# Patient Record
Sex: FEMALE | Race: BLACK OR AFRICAN AMERICAN | NOT HISPANIC OR LATINO | Employment: FULL TIME | URBAN - METROPOLITAN AREA
[De-identification: names, ages, dates, MRNs, and addresses within clinical notes are randomized per-mention and may not be internally consistent; named-entity substitution may affect disease eponyms.]

---

## 2017-03-29 PROBLEM — R73.03 PREDIABETES: Status: ACTIVE | Noted: 2017-03-29

## 2017-07-24 ENCOUNTER — HOSPITAL ENCOUNTER (EMERGENCY)
Facility: HOSPITAL | Age: 32
Discharge: HOME | End: 2017-07-24
Attending: EMERGENCY MEDICINE
Payer: COMMERCIAL

## 2017-07-24 VITALS
SYSTOLIC BLOOD PRESSURE: 130 MMHG | WEIGHT: 180 LBS | HEIGHT: 66 IN | BODY MASS INDEX: 28.93 KG/M2 | HEART RATE: 74 BPM | TEMPERATURE: 99 F | RESPIRATION RATE: 20 BRPM | DIASTOLIC BLOOD PRESSURE: 83 MMHG | OXYGEN SATURATION: 99 %

## 2017-07-24 DIAGNOSIS — T23.221A: ICD-10-CM

## 2017-07-24 DIAGNOSIS — T22.211A: Primary | ICD-10-CM

## 2017-07-24 LAB
B-HCG UR QL: NEGATIVE
CTP QC/QA: YES

## 2017-07-24 PROCEDURE — 25000003 PHARM REV CODE 250: Performed by: PHYSICIAN ASSISTANT

## 2017-07-24 PROCEDURE — 16020 DRESS/DEBRID P-THICK BURN S: CPT

## 2017-07-24 PROCEDURE — 81025 URINE PREGNANCY TEST: CPT | Performed by: EMERGENCY MEDICINE

## 2017-07-24 PROCEDURE — 99283 EMERGENCY DEPT VISIT LOW MDM: CPT | Mod: 25

## 2017-07-24 RX ORDER — ACETAMINOPHEN 325 MG/1
650 TABLET ORAL
Status: COMPLETED | OUTPATIENT
Start: 2017-07-24 | End: 2017-07-24

## 2017-07-24 RX ORDER — MELOXICAM 7.5 MG/1
7.5 TABLET ORAL DAILY
Qty: 12 TABLET | Refills: 0 | Status: SHIPPED | OUTPATIENT
Start: 2017-07-24 | End: 2018-04-09 | Stop reason: ALTCHOICE

## 2017-07-24 RX ORDER — IBUPROFEN 200 MG
800 TABLET ORAL EVERY 6 HOURS PRN
COMMUNITY
End: 2018-04-09 | Stop reason: ALTCHOICE

## 2017-07-24 RX ORDER — DOXYCYCLINE 100 MG/1
100 CAPSULE ORAL 2 TIMES DAILY
Qty: 20 CAPSULE | Refills: 0 | Status: SHIPPED | OUTPATIENT
Start: 2017-07-24 | End: 2017-08-03

## 2017-07-24 RX ORDER — DOXYCYCLINE HYCLATE 100 MG
100 TABLET ORAL
Status: COMPLETED | OUTPATIENT
Start: 2017-07-24 | End: 2017-07-24

## 2017-07-24 RX ADMIN — BACITRACIN ZINC, NEOMYCIN SULFATE, POLYMYXIN B SULFATE 1 EACH: 3.5; 5000; 4 OINTMENT TOPICAL at 06:07

## 2017-07-24 RX ADMIN — DOXYCYCLINE HYCLATE 100 MG: 100 TABLET, COATED ORAL at 06:07

## 2017-07-24 RX ADMIN — ACETAMINOPHEN 650 MG: 325 TABLET, FILM COATED ORAL at 06:07
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
## 2017-08-07 ENCOUNTER — OFFICE VISIT (OUTPATIENT)
Dept: URGENT CARE | Facility: CLINIC | Age: 32
End: 2017-08-07
Payer: COMMERCIAL

## 2017-08-07 VITALS
HEART RATE: 85 BPM | WEIGHT: 241.81 LBS | BODY MASS INDEX: 38.86 KG/M2 | TEMPERATURE: 98 F | DIASTOLIC BLOOD PRESSURE: 88 MMHG | SYSTOLIC BLOOD PRESSURE: 135 MMHG | HEIGHT: 66 IN | RESPIRATION RATE: 18 BRPM

## 2017-08-07 DIAGNOSIS — T22.211A PARTIAL THICKNESS BURN OF RIGHT FOREARM, INITIAL ENCOUNTER: Primary | ICD-10-CM

## 2017-08-07 PROCEDURE — 99203 OFFICE O/P NEW LOW 30 MIN: CPT | Mod: 25,S$GLB,, | Performed by: NURSE PRACTITIONER

## 2017-08-07 PROCEDURE — 97597 DBRDMT OPN WND 1ST 20 CM/<: CPT | Mod: S$GLB,,, | Performed by: NURSE PRACTITIONER

## 2017-08-07 PROCEDURE — 3008F BODY MASS INDEX DOCD: CPT | Mod: S$GLB,,, | Performed by: NURSE PRACTITIONER

## 2017-08-07 RX ORDER — AMITRIPTYLINE HYDROCHLORIDE 25 MG/1
TABLET, FILM COATED ORAL
Refills: 1 | COMMUNITY
Start: 2017-05-25 | End: 2018-04-09 | Stop reason: ALTCHOICE

## 2017-08-07 RX ORDER — MUPIROCIN 20 MG/G
OINTMENT TOPICAL
Qty: 22 G | Refills: 1 | Status: SHIPPED | OUTPATIENT
Start: 2017-08-07 | End: 2018-04-09 | Stop reason: ALTCHOICE

## 2017-08-07 RX ORDER — DOXYCYCLINE 100 MG/1
100 CAPSULE ORAL 2 TIMES DAILY
Qty: 20 CAPSULE | Refills: 0 | Status: SHIPPED | OUTPATIENT
Start: 2017-08-07 | End: 2017-08-17

## 2017-08-07 RX ORDER — IBUPROFEN 800 MG/1
TABLET ORAL
Refills: 0 | COMMUNITY
Start: 2017-07-17 | End: 2018-04-09 | Stop reason: ALTCHOICE
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
## 2017-08-07 NOTE — PROGRESS NOTES
Subjective:       Patient ID: Ghanshyam Harrison is a 32 y.o. female.    Chief Complaint: Burn    Burn       ROS    Objective:      Physical Exam    Assessment:       No diagnosis found.    Plan:       There are no diagnoses linked to this encounter.

## 2017-08-07 NOTE — PATIENT INSTRUCTIONS
Second-Degree Burn  A burn occurs when skin is exposed to too much heat, sun, or harsh chemicals. A second-degree burn (partial-thickness burn) is deeper than a first-degree burn (superficial burn). It usually causes a blister to form. The blister may remain intact and gradually go away on its own. Or it may break open. The goal of treatment is to relieve pain and stop infection while the burn heals.   Home care  Use pain medicine as directed. If no pain medicine was prescribed, you may use acetaminophen or ibuprofen to control pain. If you have chronic liver or kidney disease, talk with your health care provider before using these medicine. Also talk with your provider if you've had a stomach ulcer or GI bleeding.  General care  · On the first day, you may put a cool compress on the wound to ease pain. A cool compress is a small towel soaked in cool water.  · If you were sent home with the blister intact, don't break the blister. The risk for infection is greater if the blister breaks. If a bandage was applied, change it once a day, unless told otherwise. If the bandage becomes wet or soiled, change it as soon as you can.  · Sometimes an infection may occur even with proper treatment. Check the burn daily for the signs of infection listed below.  · Eat more calories and protein until your wound is healed.  · Wear a hat, sunscreen, and long sleeves while in the sun to protect the skin.  · Don't pick or scratch at the wound. Use over-the-counter medicines like diphenhydramine for itching.  · Avoid tight-fitting clothes.  To change a bandage:  · Wash your hands.  · Take off the old bandage. If the bandage sticks, soak it off under warm running water.  · Once the bandage is off, gently wash the burn area with mild soap and warm water to remove any cream, ointment, ooze, or scab. You may do this in a sink, under a tub faucet, or in the shower. Rinse off the soap and gently pat dry with a clean towel.  · Check for  signs of infection listed below.  · Put any prescribed antibiotic cream or ointment on the wound.  · Cover the burn with nonstick gauze. Then wrap it with the bandage material.  Follow-up care  Follow up with your health care provider, or as advised.  When to seek medical advice  Call your health care provider right away if you have any of these signs of infection:  · Fever over 100.4°F (38°C)  · Pain that gets worse  · Redness or swelling that gets worse  · Pus comes from the burn  · Red streaks in your skin coming from the burn  · Wound doesn't appear to be healing  · Nausea or vomiting   Date Last Reviewed: 10/24/2014  © 0296-4368 [employer]. [address] [place] [address], [place] [address]. All rights reserved. This information is not intended as a substitute for professional medical care. Always follow your healthcare professional's instructions.      DISCONTINUE USING ALOE PLANT ON BURN WOUND  WOUND CARE 2-3 TIMES A DAY AS DISCUSSED    Please return here or go to the Emergency Department for any concerns or worsening of condition.  If you were prescribed antibiotics, please take them to completion.  If you were prescribed a narcotic medication, do not drive or operate heavy equipment or machinery while taking these medications.  Please follow up with your primary care doctor or specialist as needed.    If you  smoke, please stop smoking.

## 2018-04-09 ENCOUNTER — LAB VISIT (OUTPATIENT)
Dept: LAB | Facility: HOSPITAL | Age: 33
End: 2018-04-09
Attending: OBSTETRICS & GYNECOLOGY
Payer: COMMERCIAL

## 2018-04-09 ENCOUNTER — OFFICE VISIT (OUTPATIENT)
Dept: OBSTETRICS AND GYNECOLOGY | Facility: CLINIC | Age: 33
End: 2018-04-09
Payer: COMMERCIAL

## 2018-04-09 VITALS
BODY MASS INDEX: 36.14 KG/M2 | DIASTOLIC BLOOD PRESSURE: 74 MMHG | SYSTOLIC BLOOD PRESSURE: 118 MMHG | HEIGHT: 66 IN | WEIGHT: 224.88 LBS

## 2018-04-09 DIAGNOSIS — N91.2 AMENORRHEA: Primary | ICD-10-CM

## 2018-04-09 DIAGNOSIS — Z86.39: ICD-10-CM

## 2018-04-09 DIAGNOSIS — N92.6 MISSED PERIOD: ICD-10-CM

## 2018-04-09 LAB
B-HCG UR QL: POSITIVE
CTP QC/QA: YES
HCG INTACT+B SERPL-ACNC: 59 MIU/ML
PROLACTIN SERPL IA-MCNC: 9 NG/ML

## 2018-04-09 PROCEDURE — 81025 URINE PREGNANCY TEST: CPT | Mod: S$GLB,,, | Performed by: OBSTETRICS & GYNECOLOGY

## 2018-04-09 PROCEDURE — 36415 COLL VENOUS BLD VENIPUNCTURE: CPT

## 2018-04-09 PROCEDURE — 99999 PR PBB SHADOW E&M-EST. PATIENT-LVL III: CPT | Mod: PBBFAC,,, | Performed by: OBSTETRICS & GYNECOLOGY

## 2018-04-09 PROCEDURE — 99204 OFFICE O/P NEW MOD 45 MIN: CPT | Mod: S$GLB,,, | Performed by: OBSTETRICS & GYNECOLOGY

## 2018-04-09 PROCEDURE — 84702 CHORIONIC GONADOTROPIN TEST: CPT

## 2018-04-09 PROCEDURE — 84146 ASSAY OF PROLACTIN: CPT

## 2018-04-09 RX ORDER — BROMOCRIPTINE MESYLATE 2.5 MG/1
TABLET ORAL
Refills: 0 | COMMUNITY
Start: 2018-03-09 | End: 2018-05-07

## 2018-04-11 ENCOUNTER — LAB VISIT (OUTPATIENT)
Dept: LAB | Facility: HOSPITAL | Age: 33
End: 2018-04-11
Attending: OBSTETRICS & GYNECOLOGY
Payer: COMMERCIAL

## 2018-04-11 ENCOUNTER — PATIENT MESSAGE (OUTPATIENT)
Dept: OBSTETRICS AND GYNECOLOGY | Facility: CLINIC | Age: 33
End: 2018-04-11

## 2018-04-11 DIAGNOSIS — N92.6 MISSED PERIOD: ICD-10-CM

## 2018-04-11 LAB — HCG INTACT+B SERPL-ACNC: 121 MIU/ML

## 2018-04-11 PROCEDURE — 36415 COLL VENOUS BLD VENIPUNCTURE: CPT

## 2018-04-11 PROCEDURE — 84702 CHORIONIC GONADOTROPIN TEST: CPT

## 2018-04-12 ENCOUNTER — TELEPHONE (OUTPATIENT)
Dept: OBSTETRICS AND GYNECOLOGY | Facility: CLINIC | Age: 33
End: 2018-04-12

## 2018-04-12 ENCOUNTER — PATIENT MESSAGE (OUTPATIENT)
Dept: OBSTETRICS AND GYNECOLOGY | Facility: CLINIC | Age: 33
End: 2018-04-12

## 2018-04-12 DIAGNOSIS — Z34.90 EARLY STAGE OF PREGNANCY: Primary | ICD-10-CM

## 2018-04-12 NOTE — TELEPHONE ENCOUNTER
----- Message from Julia Corrigan sent at 4/11/2018  3:59 PM CDT -----  Contact: self  Patient is calling for test result and can be reached at [phone].    Thanks,

## 2018-04-12 NOTE — TELEPHONE ENCOUNTER
----- Message from Rhina Aparicio sent at 4/12/2018 10:54 AM CDT -----  Contact: Self/ [phone]  Pt was returning phone call for results again. Thank you!

## 2018-04-12 NOTE — TELEPHONE ENCOUNTER
Spoke with pt and informed her that I will speak to  about releasing her HCG result through the portal. Pt is also requesting progesterone labs to be placed

## 2018-04-13 ENCOUNTER — LAB VISIT (OUTPATIENT)
Dept: LAB | Facility: HOSPITAL | Age: 33
End: 2018-04-13
Attending: OBSTETRICS & GYNECOLOGY
Payer: COMMERCIAL

## 2018-04-13 DIAGNOSIS — N92.6 MISSED PERIOD: ICD-10-CM

## 2018-04-13 DIAGNOSIS — Z34.90 EARLY STAGE OF PREGNANCY: ICD-10-CM

## 2018-04-13 LAB
HCG INTACT+B SERPL-ACNC: 323 MIU/ML
PROGEST SERPL-MCNC: 28.3 NG/ML

## 2018-04-13 PROCEDURE — 36415 COLL VENOUS BLD VENIPUNCTURE: CPT

## 2018-04-13 PROCEDURE — 84702 CHORIONIC GONADOTROPIN TEST: CPT

## 2018-04-13 PROCEDURE — 84144 ASSAY OF PROGESTERONE: CPT

## 2018-04-15 PROBLEM — Z86.39: Status: ACTIVE | Noted: 2018-04-15

## 2018-04-15 NOTE — PROGRESS NOTES
[facility]  Ambulatory Clinic  Obstetrics & Gynecology    Visit Date:  2018     Chief Complaint:  Missed my period    History of Present Illness:      Ghanshyam Harrison is a 33 y.o. , UPT positive today, here with c/o missed period.    Patient's last menstrual period was 2018 (approximate).    Pt has no major complaints today and denies any vaginal bleeding, discharge, pain, GI/ compliants.      Pt has h/o elective  x 1 with suction D&C and spontaneous  x 1 ~6 wks gestation.    Pt has h/o prolactinemia dx earlier this year, currently on bromocriptine 2.5 mg qday.  Pt reports a normal CT of head.    Pt reports overall good health.    Past Medical History:      Obesity  Prolactinemia, dx 2018, currently on bromocriptine 2.5 mg qd    Past Surgical History:      Open cholecystectomy   Suction D&C     Medications:      Prenatal vitamins   Bromocriptine 2.5 mg qd - pt advised to stop as prolactin level is normal    Allergies:      Review of patient's allergies indicates:   Allergen Reactions    Ace inhibitors Anaphylaxis    Amoxicillin Anaphylaxis    Bactrim [sulfamethoxazole-trimethoprim] Anaphylaxis    Lisinopril Anaphylaxis    Penicillins Anaphylaxis     Obstetric History:       Para Term  AB Living   3 0     2 0   SAB TAB Ectopic Multiple Live Births   1              # Outcome Date GA Lbr Raul/2nd Weight Sex Delivery Anes PTL Lv   3 Current            2 SAB            1 AB              Gynecologic History:      Denies recent/active STI  Denies history abnormal Pap     Social History:      Denies tobacco, alcohol or illicit drug use  Current partner is father of baby  Denies domestic abuse     Family History:      Denies congenital anomalies, inherited syndromes, fetal aneuploidy    Review of Systems:      Constitutional:  No fever, fatigue  HENT:  No congestion, hearing changes  Eyes:  No visual disturbance  Respiratory:  No cough,  "shortness of breath  Cardiovascular:  No chest pain, leg swelling  Breast:  No lump, pain, nipple discharge, redness, skin changes  Gastrointestinal:  No abdominal pain, constipation, blood in stool   Genitourinary:  No dysuria, frequency  Endocrine:  No heat or cold intolerance  Musculoskeletal:  No back pain, arthralgias  Skin:  No rash, jaundice  Neurological:  No dizziness, weakness, headaches  Psychiatric/Behavioral:  No sleep disturbance, dysphoric mood     Physical Exam:     /74 (BP Location: Right arm, Patient Position: Sitting, BP Method: Large (Manual))   Ht 5' 6" (1.676 m)   Wt 102 kg (224 lb 13.9 oz)   LMP 2018 (Approximate)   BMI 36.29 kg/m²      GENERAL:  NAD. Well-nourished. A&Ox3.  HEENT:  NCAT, EOMI, moist mucus membranes.  Neck supple w/o masses.  BREAST:  Symmetric, no obvious masses, adenopathy, skin changes or nipple discharge.  LUNGS:  CTA-B.  HEART:  RRR, physiologic heart sounds.  ABDOMEN:  Soft, non-tender. Normoactive BS.  No obvious organomegaly.   EXT:  Symmetric w/o cramping, claudication, or edema. +2 distal pulses. FROM.  SKIN:  No rashes  NEURO:  CN II - XII grossly intact bilaterally. +2 DTR. Visual field test normal.  PSYCH:  Mood & affect appropriate.       PELVIC:  Female external genitalia w/o any obvious lesions.  Adequate perineal body. Normal urethral meatus. No gross lymphadenopathy.    Vagina:  Pink, moist, well-rugated.  Vaginal vault with good support.  No obvious lesion.  No discharge noted.     Cervix:  No cervical motion tenderness, discharge, or obvious lesions.  Closed, thick, posterior.  Uterus:  Small, non-tender, normal contour.  Adnexa:  No masses or tenderness.    Rectal:  Declined.  No obvious external lesions.   Wet prep:  Negative    Chaperone present for exam.    Assessment:     1. 33 y.o. , UPT positive, LMP 2018, here for confirmation of pregnancy  2. Prolactinemia, dx 2018, currently on bromocriptine 2.5 mg qd  3. Maternal " obesity - Body mass index is 36.29 kg/m².    Plan:    We discussed principles of prenatal care, weight gain goals, dietary/lifestyle modifications, pregnancy care instructions and precautions.  Prenatal educational material given to pt.  Continue prenatal vitamins.  SAB and ectopic precautions reviewed.      Discussed the implication of prolactinemia on her pregnancy.  Order prolactin level today, if normal pt was advised to stop bromocriptine.  Serial prolactin level during pregnancy.  Resume treatment bromocriptine therapy if prolactin elevated or clinical signs of prolactinoma.  MRI of brain as clinically indicated.      Discussed implications of obesity on pregnancy including but are not limited to miscarriage, poor ultrasound visualization, increase in congenital malformations (especially NTD's and cardiac anomalies),  birth, ascending infections, gestational diabetes, hypertensive diseases of pregnancy, macrosomia, shoulder dystocia, cerebral palsy and surgical complications such as wound infections, dehiscence and thrombosis.  The [employer] has recommended no more than a 15# weight gain in patients with class III (BMI >/= 40) obesity or greater.  Encourage healthy lifestyle modifications.    Return in 4 weeks to initiate prenatal care.  All questions answered, pt voiced understanding.      Jonel Pollard MD

## 2018-05-07 ENCOUNTER — INITIAL PRENATAL (OUTPATIENT)
Dept: OBSTETRICS AND GYNECOLOGY | Facility: CLINIC | Age: 33
End: 2018-05-07
Payer: COMMERCIAL

## 2018-05-07 ENCOUNTER — LAB VISIT (OUTPATIENT)
Dept: LAB | Facility: HOSPITAL | Age: 33
End: 2018-05-07
Attending: OBSTETRICS & GYNECOLOGY
Payer: COMMERCIAL

## 2018-05-07 VITALS
DIASTOLIC BLOOD PRESSURE: 80 MMHG | SYSTOLIC BLOOD PRESSURE: 115 MMHG | HEART RATE: 64 BPM | WEIGHT: 227.06 LBS | BODY MASS INDEX: 36.65 KG/M2

## 2018-05-07 DIAGNOSIS — Z34.91 PREGNANCY WITH ONE FETUS IN FIRST TRIMESTER: Primary | ICD-10-CM

## 2018-05-07 DIAGNOSIS — Z34.91 PREGNANCY WITH ONE FETUS IN FIRST TRIMESTER: ICD-10-CM

## 2018-05-07 DIAGNOSIS — Z36.89 ENCOUNTER TO ESTABLISH GESTATIONAL AGE USING ULTRASOUND: ICD-10-CM

## 2018-05-07 LAB
ABO + RH BLD: NORMAL
ALBUMIN SERPL BCP-MCNC: 3.6 G/DL
ALP SERPL-CCNC: 88 U/L
ALT SERPL W/O P-5'-P-CCNC: 40 U/L
ANION GAP SERPL CALC-SCNC: 9 MMOL/L
AST SERPL-CCNC: 22 U/L
BASOPHILS # BLD AUTO: 0.01 K/UL
BASOPHILS NFR BLD: 0.2 %
BILIRUB SERPL-MCNC: 0.4 MG/DL
BLD GP AB SCN CELLS X3 SERPL QL: NORMAL
BUN SERPL-MCNC: 6 MG/DL
CALCIUM SERPL-MCNC: 9.5 MG/DL
CHLORIDE SERPL-SCNC: 107 MMOL/L
CO2 SERPL-SCNC: 19 MMOL/L
CREAT SERPL-MCNC: 0.6 MG/DL
DIFFERENTIAL METHOD: ABNORMAL
EOSINOPHIL # BLD AUTO: 0.1 K/UL
EOSINOPHIL NFR BLD: 1.1 %
ERYTHROCYTE [DISTWIDTH] IN BLOOD BY AUTOMATED COUNT: 15.7 %
EST. GFR  (AFRICAN AMERICAN): >60 ML/MIN/1.73 M^2
EST. GFR  (NON AFRICAN AMERICAN): >60 ML/MIN/1.73 M^2
ESTIMATED AVG GLUCOSE: 105 MG/DL
GLUCOSE SERPL-MCNC: 87 MG/DL
HBA1C MFR BLD HPLC: 5.3 %
HCT VFR BLD AUTO: 34.1 %
HGB BLD-MCNC: 11.4 G/DL
LYMPHOCYTES # BLD AUTO: 2.6 K/UL
LYMPHOCYTES NFR BLD: 39.2 %
MCH RBC QN AUTO: 25.7 PG
MCHC RBC AUTO-ENTMCNC: 33.4 G/DL
MCV RBC AUTO: 77 FL
MONOCYTES # BLD AUTO: 0.4 K/UL
MONOCYTES NFR BLD: 6.1 %
NEUTROPHILS # BLD AUTO: 3.5 K/UL
NEUTROPHILS NFR BLD: 53.2 %
PLATELET # BLD AUTO: 330 K/UL
PMV BLD AUTO: 10.8 FL
POTASSIUM SERPL-SCNC: 3.9 MMOL/L
PROT SERPL-MCNC: 7.2 G/DL
RBC # BLD AUTO: 4.43 M/UL
SODIUM SERPL-SCNC: 135 MMOL/L
WBC # BLD AUTO: 6.61 K/UL

## 2018-05-07 PROCEDURE — 80053 COMPREHEN METABOLIC PANEL: CPT

## 2018-05-07 PROCEDURE — 86592 SYPHILIS TEST NON-TREP QUAL: CPT

## 2018-05-07 PROCEDURE — 86901 BLOOD TYPING SEROLOGIC RH(D): CPT

## 2018-05-07 PROCEDURE — 83036 HEMOGLOBIN GLYCOSYLATED A1C: CPT

## 2018-05-07 PROCEDURE — 85025 COMPLETE CBC W/AUTO DIFF WBC: CPT

## 2018-05-07 PROCEDURE — 86762 RUBELLA ANTIBODY: CPT

## 2018-05-07 PROCEDURE — 87086 URINE CULTURE/COLONY COUNT: CPT

## 2018-05-07 PROCEDURE — 83020 HEMOGLOBIN ELECTROPHORESIS: CPT

## 2018-05-07 PROCEDURE — 76801 OB US < 14 WKS SINGLE FETUS: CPT | Mod: S$GLB,,, | Performed by: OBSTETRICS & GYNECOLOGY

## 2018-05-07 PROCEDURE — 86803 HEPATITIS C AB TEST: CPT

## 2018-05-07 PROCEDURE — 87491 CHLMYD TRACH DNA AMP PROBE: CPT

## 2018-05-07 PROCEDURE — 0500F INITIAL PRENATAL CARE VISIT: CPT | Mod: S$GLB,,, | Performed by: OBSTETRICS & GYNECOLOGY

## 2018-05-07 PROCEDURE — 36415 COLL VENOUS BLD VENIPUNCTURE: CPT

## 2018-05-07 PROCEDURE — 86703 HIV-1/HIV-2 1 RESULT ANTBDY: CPT

## 2018-05-07 PROCEDURE — 99999 PR PBB SHADOW E&M-EST. PATIENT-LVL III: CPT | Mod: PBBFAC,,, | Performed by: OBSTETRICS & GYNECOLOGY

## 2018-05-07 PROCEDURE — 87340 HEPATITIS B SURFACE AG IA: CPT

## 2018-05-07 NOTE — PROGRESS NOTES
[facility]  Ambulatory Clinic  Obstetrics & Gynecology    Visit Date:  2018     Chief Complaint:  Missed my period    History of Present Illness:      Ghanshyam Harrison is a 33 y.o. , UPT positive today, here with c/o missed period.    Patient's last menstrual period was 2018 (approximate).    Pt has no major complaints today and denies any vaginal bleeding, discharge, pain, GI/ compliants.      Pt has h/o elective  x 1 with suction D&C and spontaneous  x 1 ~6 wks gestation.    Pt has h/o prolactinemia dx earlier this year, currently on bromocriptine 2.5 mg qday.  Pt reports a normal CT of head.    Pt reports overall good health.    Past Medical History:      Obesity  Prolactinemia, dx 2018, currently on bromocriptine 2.5 mg qd    Past Surgical History:      Open cholecystectomy   Suction D&C     Medications:      Prenatal vitamins   Bromocriptine 2.5 mg qd - pt advised to stop as prolactin level is normal    Allergies:      Review of patient's allergies indicates:   Allergen Reactions    Ace inhibitors Anaphylaxis    Amoxicillin Anaphylaxis    Bactrim [sulfamethoxazole-trimethoprim] Anaphylaxis    Lisinopril Anaphylaxis    Penicillins Anaphylaxis     Obstetric History:       Para Term  AB Living   3 0     2 0   SAB TAB Ectopic Multiple Live Births   1              # Outcome Date GA Lbr Raul/2nd Weight Sex Delivery Anes PTL Lv   3 Current            2 SAB            1 AB              Gynecologic History:      Denies recent/active STI  Denies history abnormal Pap     Social History:      Denies tobacco, alcohol or illicit drug use  Current partner is father of baby  Denies domestic abuse     Family History:      Denies congenital anomalies, inherited syndromes, fetal aneuploidy    Review of Systems:      Constitutional:  No fever, fatigue  HENT:  No congestion, hearing changes  Eyes:  No visual disturbance  Respiratory:  No cough,  "shortness of breath  Cardiovascular:  No chest pain, leg swelling  Breast:  No lump, pain, nipple discharge, redness, skin changes  Gastrointestinal:  No abdominal pain, constipation, blood in stool   Genitourinary:  No dysuria, frequency  Endocrine:  No heat or cold intolerance  Musculoskeletal:  No back pain, arthralgias  Skin:  No rash, jaundice  Neurological:  No dizziness, weakness, headaches  Psychiatric/Behavioral:  No sleep disturbance, dysphoric mood     Physical Exam:     /74 (BP Location: Right arm, Patient Position: Sitting, BP Method: Large (Manual))   Ht 5' 6" (1.676 m)   Wt 102 kg (224 lb 13.9 oz)   LMP 2018 (Approximate)   BMI 36.29 kg/m²      GENERAL:  NAD. Well-nourished. A&Ox3.  HEENT:  NCAT, EOMI, moist mucus membranes.  Neck supple w/o masses.  BREAST:  Symmetric, no obvious masses, adenopathy, skin changes or nipple discharge.  LUNGS:  CTA-B.  HEART:  RRR, physiologic heart sounds.  ABDOMEN:  Soft, non-tender. Normoactive BS.  No obvious organomegaly.   EXT:  Symmetric w/o cramping, claudication, or edema. +2 distal pulses. FROM.  SKIN:  No rashes  NEURO:  CN II - XII grossly intact bilaterally. +2 DTR. Visual field test normal.  PSYCH:  Mood & affect appropriate.       PELVIC:  Female external genitalia w/o any obvious lesions.  Adequate perineal body. Normal urethral meatus. No gross lymphadenopathy.    Vagina:  Pink, moist, well-rugated.  Vaginal vault with good support.  No obvious lesion.  No discharge noted.     Cervix:  No cervical motion tenderness, discharge, or obvious lesions.  Closed, thick, posterior.  Uterus:  Small, non-tender, normal contour.  Adnexa:  No masses or tenderness.    Rectal:  Declined.  No obvious external lesions.   Wet prep:  Negative    Chaperone present for exam.    Assessment:     1. 33 y.o. , UPT positive, LMP 2018, here for confirmation of pregnancy  2. Prolactinemia, dx 2018, currently on bromocriptine 2.5 mg qd  3. Maternal " obesity - Body mass index is 36.65 kg/m².    Plan:    We discussed principles of prenatal care, weight gain goals, dietary/lifestyle modifications, pregnancy care instructions and precautions.  Prenatal educational material given to pt.  Continue prenatal vitamins.  SAB and ectopic precautions reviewed.      Discussed the implication of prolactinemia on her pregnancy.  Order prolactin level today, if normal pt was advised to stop bromocriptine.  Serial prolactin level during pregnancy.  Resume treatment bromocriptine therapy if prolactin elevated or clinical signs of prolactinoma.  MRI of brain as clinically indicated.      Discussed implications of obesity on pregnancy including but are not limited to miscarriage, poor ultrasound visualization, increase in congenital malformations (especially NTD's and cardiac anomalies),  birth, ascending infections, gestational diabetes, hypertensive diseases of pregnancy, macrosomia, shoulder dystocia, cerebral palsy and surgical complications such as wound infections, dehiscence and thrombosis.  The [place] [employer] has recommended no more than a 15# weight gain in patients with class III (BMI >/= 40) obesity or greater.  Encourage healthy lifestyle modifications.    Return in 4 weeks to initiate prenatal care.  All questions answered, pt voiced understanding.      Jonel Pollard MD    _____________________________________________________________________    2018    7w5d here for initial OB visit and dating ultrasound.  Pregnancy complicated by h/o prolactinemia and obesity.  Pt has no major complaints today.   Dating u/s shows single live IUP with JAMES 18.  Limitation of today's u/s exam discussed.    Prolactin level normal.   Component      Latest Ref Rng & Units 2018   Prolactin      5.2 - 26.5 ng/mL 9.0   Pt advised not to take the bromocriptine, order serial prolactin level, restart bromocriptine as clinically indicated.    Discussed implication of  obesity on her pregnancy.  Pt declined early DM screen.  Discussed daily low dose ASA for prevention of preE ~14 wks due to risk factors.    Order initial OB labs.  Pt declined first trimester aneuploidy screening, and state she would not terminate the pregnancy for any reasons.  Principles of prenatal care and precautions reviewed.  Encourage healthy lifestyle modifications.  Return 4 wks.  Voiced understanding.    Jonel Pollard MD  _____________________________________________________________________

## 2018-05-08 LAB
C TRACH DNA SPEC QL NAA+PROBE: NOT DETECTED
HBV SURFACE AG SERPL QL IA: NEGATIVE
HCV AB SERPL QL IA: NEGATIVE
HIV 1+2 AB+HIV1 P24 AG SERPL QL IA: NEGATIVE
N GONORRHOEA DNA SPEC QL NAA+PROBE: NOT DETECTED
RPR SER QL: NORMAL
RUBV IGG SER-ACNC: 25.1 IU/ML
RUBV IGG SER-IMP: REACTIVE

## 2018-05-09 LAB
BACTERIA UR CULT: NORMAL
HGB A2 MFR BLD HPLC: 2.3 %
HGB FRACT BLD ELPH-IMP: NORMAL
HGB FRACT BLD ELPH-IMP: NORMAL

## 2018-05-14 ENCOUNTER — PATIENT MESSAGE (OUTPATIENT)
Dept: OBSTETRICS AND GYNECOLOGY | Facility: CLINIC | Age: 33
End: 2018-05-14

## 2018-05-14 DIAGNOSIS — B37.31 VAGINAL YEAST INFECTION: Primary | ICD-10-CM

## 2018-05-14 RX ORDER — TERCONAZOLE 8 MG/G
1 CREAM VAGINAL NIGHTLY
Qty: 20 G | Refills: 0 | Status: SHIPPED | OUTPATIENT
Start: 2018-05-14 | End: 2018-05-17

## 2018-06-04 ENCOUNTER — ROUTINE PRENATAL (OUTPATIENT)
Dept: OBSTETRICS AND GYNECOLOGY | Facility: CLINIC | Age: 33
End: 2018-06-04
Payer: COMMERCIAL

## 2018-06-04 VITALS
SYSTOLIC BLOOD PRESSURE: 115 MMHG | WEIGHT: 226.88 LBS | DIASTOLIC BLOOD PRESSURE: 73 MMHG | BODY MASS INDEX: 36.62 KG/M2

## 2018-06-04 DIAGNOSIS — Z34.91 PREGNANCY WITH ONE FETUS IN FIRST TRIMESTER: Primary | ICD-10-CM

## 2018-06-04 PROCEDURE — 0502F SUBSEQUENT PRENATAL CARE: CPT | Mod: S$GLB,,, | Performed by: OBSTETRICS & GYNECOLOGY

## 2018-06-04 PROCEDURE — 99999 PR PBB SHADOW E&M-EST. PATIENT-LVL II: CPT | Mod: PBBFAC,,, | Performed by: OBSTETRICS & GYNECOLOGY

## 2018-06-04 RX ORDER — PNV NO.95/FERROUS FUM/FOLIC AC 28MG-0.8MG
TABLET ORAL
Refills: 11 | Status: ON HOLD | COMMUNITY
Start: 2018-05-08 | End: 2018-12-04 | Stop reason: HOSPADM

## 2018-06-04 NOTE — PROGRESS NOTES
11w5d here for initial OB visit.  Pregnancy complicated by h/o prolactinemia and obesity.  Pt has no major complaints today.   Initial OB lab results d/w pt.  Discussed daily low dose ASA for prevention of preE due to risk factors.  Pt declined first trimester aneuploidy screening, and state she would not terminate the pregnancy for any reasons.  Encourage healthy lifestyle modifications.  Precautions reviewed.  Return 4 wks.  Voiced understanding.

## 2018-07-02 ENCOUNTER — ROUTINE PRENATAL (OUTPATIENT)
Dept: OBSTETRICS AND GYNECOLOGY | Facility: CLINIC | Age: 33
End: 2018-07-02
Payer: COMMERCIAL

## 2018-07-02 VITALS
WEIGHT: 233.38 LBS | DIASTOLIC BLOOD PRESSURE: 82 MMHG | BODY MASS INDEX: 37.66 KG/M2 | SYSTOLIC BLOOD PRESSURE: 140 MMHG

## 2018-07-02 DIAGNOSIS — Z34.92 PREGNANCY WITH ONE FETUS IN SECOND TRIMESTER: Primary | ICD-10-CM

## 2018-07-02 PROCEDURE — 99999 PR PBB SHADOW E&M-EST. PATIENT-LVL II: CPT | Mod: PBBFAC,,, | Performed by: OBSTETRICS & GYNECOLOGY

## 2018-07-02 PROCEDURE — 0502F SUBSEQUENT PRENATAL CARE: CPT | Mod: S$GLB,,, | Performed by: OBSTETRICS & GYNECOLOGY

## 2018-07-03 NOTE — PROGRESS NOTES
15w5d here for initial OB visit.  Pregnancy complicated by h/o prolactinemia and obesity.    Pt has no major complaints today.   Reports active fetus.  Denies vaginal bleeding, leakage of fluid, or contractions.    Initial BP on arrival was abnormal 140/82.  Repeat /72, P 68, R16 at 15 mins rest.  Pt state she was rushing to clinic.  Start home BP monitoring BID, parameters to call reviewed.  Discussed implication of HTN on pregnancy.  Encourage daily ASA therapy for prevention of preE.  Encourage healthy lifestyle modifications.    Order quad screen today.  Refer to [facility] for anatomy ultrasound next visit.    Labor/HTN precautions.  Return 4 wks.  Voiced understanding.

## 2018-07-05 ENCOUNTER — LAB VISIT (OUTPATIENT)
Dept: LAB | Facility: HOSPITAL | Age: 33
End: 2018-07-05
Attending: OBSTETRICS & GYNECOLOGY
Payer: COMMERCIAL

## 2018-07-05 DIAGNOSIS — Z34.92 PREGNANCY WITH ONE FETUS IN SECOND TRIMESTER: ICD-10-CM

## 2018-07-05 PROCEDURE — 81511 FTL CGEN ABNOR FOUR ANAL: CPT

## 2018-07-05 PROCEDURE — 36415 COLL VENOUS BLD VENIPUNCTURE: CPT

## 2018-07-05 PROCEDURE — 84146 ASSAY OF PROLACTIN: CPT

## 2018-07-06 LAB — PROLACTIN SERPL IA-MCNC: 52.1 NG/ML

## 2018-07-09 ENCOUNTER — PATIENT MESSAGE (OUTPATIENT)
Dept: OBSTETRICS AND GYNECOLOGY | Facility: CLINIC | Age: 33
End: 2018-07-09

## 2018-07-09 DIAGNOSIS — Z34.92 PREGNANCY WITH ONE FETUS IN SECOND TRIMESTER: Primary | ICD-10-CM

## 2018-07-09 DIAGNOSIS — R79.89 INCREASED PROLACTIN LEVEL: ICD-10-CM

## 2018-07-09 DIAGNOSIS — R79.89 ELEVATED PROLACTIN LEVEL: ICD-10-CM

## 2018-07-09 LAB
# FETUSES US: NORMAL
2ND TRIMESTER 4 SCREEN PNL SERPL: NEGATIVE
2ND TRIMESTER 4 SCREEN SERPL-IMP: NORMAL
AFP MOM SERPL: 1.01
AFP SERPL-MCNC: 26.9 NG/ML
AGE AT DELIVERY: 31
B-HCG MOM SERPL: 0.24
B-HCG SERPL-ACNC: 7.4 IU/ML
FET TS 21 RISK FROM MAT AGE: NORMAL
GA (DAYS): 1 D
GA (WEEKS): 16 WK
GA METHOD: NORMAL
IDDM PATIENT QL: NORMAL
INHIBIN A MOM SERPL: 0.75
INHIBIN A SERPL-MCNC: 103.2 PG/ML
SMOKING STATUS FTND: NORMAL
TS 18 RISK FETUS: NORMAL
TS 21 RISK FETUS: NORMAL
U ESTRIOL MOM SERPL: 0.97
U ESTRIOL SERPL-MCNC: 0.67 NG/ML

## 2018-07-09 NOTE — TELEPHONE ENCOUNTER
D/w with pt mild elevation in prolactin.  We reviewed physiology of prolactin during pregnancy.  Will refer to [facility] for anatomy u/s and consult prolactinemia.    Neuro precautions reviewed.  Voiced understanding.

## 2018-07-16 ENCOUNTER — PATIENT MESSAGE (OUTPATIENT)
Dept: OBSTETRICS AND GYNECOLOGY | Facility: CLINIC | Age: 33
End: 2018-07-16

## 2018-07-20 ENCOUNTER — ROUTINE PRENATAL (OUTPATIENT)
Dept: OBSTETRICS AND GYNECOLOGY | Facility: CLINIC | Age: 33
End: 2018-07-20
Payer: COMMERCIAL

## 2018-07-20 VITALS — WEIGHT: 238 LBS | BODY MASS INDEX: 38.41 KG/M2 | DIASTOLIC BLOOD PRESSURE: 72 MMHG | SYSTOLIC BLOOD PRESSURE: 132 MMHG

## 2018-07-20 DIAGNOSIS — Z3A.18 18 WEEKS GESTATION OF PREGNANCY: Primary | ICD-10-CM

## 2018-07-20 DIAGNOSIS — R79.89 ELEVATED PROLACTIN LEVEL: ICD-10-CM

## 2018-07-20 PROBLEM — Z34.90 PREGNANCY WITH ONE FETUS: Status: ACTIVE | Noted: 2018-07-20

## 2018-07-20 PROBLEM — R73.03 PREDIABETES: Status: RESOLVED | Noted: 2017-03-29 | Resolved: 2018-07-20

## 2018-07-20 PROCEDURE — 99999 PR PBB SHADOW E&M-EST. PATIENT-LVL II: CPT | Mod: PBBFAC,,, | Performed by: OBSTETRICS & GYNECOLOGY

## 2018-07-20 PROCEDURE — 0502F SUBSEQUENT PRENATAL CARE: CPT | Mod: S$GLB,,, | Performed by: OBSTETRICS & GYNECOLOGY

## 2018-07-20 NOTE — PROGRESS NOTES
18w2d here for OB visit.  Pregnancy complicated by h/o prolactinemia and obesity.    Pt c/o mild vulvar irritation after shaving.  Pt recent treated for vaginal yeast infection which is resolving.      Otherwise, pt has no other complaints today.  Reports active fetus.  Denies vaginal bleeding, leakage of fluid, or contractions.    Initial BP at last visit was elevated 140/82.  BP normal today.  Pt has not start home BP monitoring, pt encourage to start home BP monitoring BID, BP parameters to call reviewed.  Encourage daily ASA therapy for prevention of preE.  Encourage healthy lifestyle modifications.    Quad screen negative.  Refer to [facility] for anatomy ultrasound and consult for prolactinemia.  Prolactin mildly elevated.  Component      Latest Ref Rng & Units 7/5/2018 4/9/2018   Prolactin      5.2 - 26.5 ng/mL 52.1 (H) 9.0   D/w pt implication of prolactinemia on her pregnancy.  Serial prolactin level q3mo.  Pt report normal MRI of brain in the past.  Neuro precautions.    Pt declined early DM screen.  Labor/HTN precautions.  Return 4 wks.  Voiced understanding.

## 2018-07-20 NOTE — PROGRESS NOTES
Routine ob, patient had yeast infection, that went away, but she shaved and irritated her skin with Dove scented soap.

## 2018-07-24 ENCOUNTER — PATIENT MESSAGE (OUTPATIENT)
Dept: OBSTETRICS AND GYNECOLOGY | Facility: CLINIC | Age: 33
End: 2018-07-24

## 2018-07-24 DIAGNOSIS — B37.31 VAGINAL YEAST INFECTION: Primary | ICD-10-CM

## 2018-07-25 RX ORDER — TERCONAZOLE 8 MG/G
1 CREAM VAGINAL NIGHTLY
Qty: 20 G | Refills: 0 | Status: SHIPPED | OUTPATIENT
Start: 2018-07-25 | End: 2018-07-28

## 2018-07-25 NOTE — TELEPHONE ENCOUNTER
Terazol sent per pt request to:    [facility] [id number] [place] [address] - [address] AT [facility] & [address]  [address]  [facility] [address]  Phone: [phone] Fax: [phone]

## 2018-08-01 ENCOUNTER — HOSPITAL ENCOUNTER (OUTPATIENT)
Dept: PERINATAL CARE | Facility: HOSPITAL | Age: 33
Discharge: HOME | End: 2018-08-01
Attending: OBSTETRICS & GYNECOLOGY
Payer: COMMERCIAL

## 2018-08-01 DIAGNOSIS — R79.89 ELEVATED PROLACTIN LEVEL: ICD-10-CM

## 2018-08-01 DIAGNOSIS — Z34.92 PREGNANCY WITH ONE FETUS IN SECOND TRIMESTER: ICD-10-CM

## 2018-08-01 DIAGNOSIS — Z36.89 ENCOUNTER FOR FETAL ANATOMIC SURVEY: ICD-10-CM

## 2018-08-01 PROCEDURE — 99202 OFFICE O/P NEW SF 15 MIN: CPT | Mod: 25,,, | Performed by: OBSTETRICS & GYNECOLOGY

## 2018-08-01 PROCEDURE — 76811 OB US DETAILED SNGL FETUS: CPT

## 2018-08-01 PROCEDURE — 76811 OB US DETAILED SNGL FETUS: CPT | Mod: 26,,, | Performed by: OBSTETRICS & GYNECOLOGY

## 2018-08-01 RX ORDER — ASPIRIN 81 MG/1
81 TABLET ORAL DAILY
Status: ON HOLD | COMMUNITY
End: 2018-12-04 | Stop reason: HOSPADM

## 2018-08-02 NOTE — PROGRESS NOTES
Indication  ========    Consultation. Fetal anatomy survey. Increased Prolactin Level.    History  ======    Risk Factors  Details: Increased Prolactin    Pregnancy History  ==============    Maternal Lab Tests  Test: quad screen  Result:  -DSR 1:40,000    Result:  -T18 1:1700    Wants to know gender: yes    Maternal Assessment  =================    Weight 107 kg  Weight (lb) 235 lb  BP syst 116 mmHg  BP diast 68 mmHg    Method  ======    Transabdominal ultrasound examination.    Pregnancy  =========    Sandoval pregnancy. Number of fetuses: 1.    Dating  ======    Cycle: regular cycle  Ultrasound examination on: 8/1/2018  GA by U/S based upon: AC, BPD, Femur, HC  GA by U/S 19 w + 3 d  JAMES by U/S: 12/23/2018  Assigned: Dating performed on 05/7/2018, based on ultrasound (CRL)  Assigned GA 20 w + 0 d  Assigned JAMES: 12/19/2018    General Evaluation  ==============    Cardiac activity: present.  bpm.  Fetal movements: visualized.  Presentation: breech.  Placenta: posterior, Placenta location from cervix subopt due to fetal leg.  Umbilical cord: 3 vessel cord, normal.  Amniotic fluid: normal amount.    Fetal Biometry  ============    Fetal Biometry  BPD 44.5 mm 19w 3d Hadlock  OFD 61.0 mm 21w 0d Daniel  .4 mm 19w 5d Hadlock  .1 mm 19w 1d Hadlock  Femur 30.7 mm 19w 4d Hadlock  Cerebellum tr 20.3 mm 20w 1d De La Cruz  CM 2.9 mm  Nuchal fold 3.78 mm  Humerus 29.2 mm 19w 4d Daniel   g 12% Mahamed  Calculated by: Hadlock (BPD-HC-AC-FL)  EFW (lb) 0 lb  EFW (oz) 10 oz  Cephalic index 0.73  HC / AC 1.25  FL / BPD 0.69  FL / AC 0.22   bpm  Head / Face / Neck   5.0 mm    Fetal Anatomy  ============    Cranium: normal  Lateral ventricles: normal  Choroid plexus: normal  Midline falx: normal  Cavum septi pellucidi: normal  Cerebellum: normal  Cisterna magna: normal  Head shape: normal  Rt lateral ventricle: normal  Lt lateral ventricle: normal  Rt choroid plexus: normal  Lt choroid  plexus: normal  Parenchyma: normal  Third ventricle: normal  Posterior fossa: normal  Cerebellar lobes: normal  Vermis: normal  Neck: appears normal  Nuchal fold: normal  Lips: normal  Profile: normal  Nose: normal  Maxilla: normal  Mandible: normal  4-chamber view: normal  RVOT: suboptimal  LVOT: normal  Heart / Thorax: Septal views: suboptimal  Situs: normal  Aortic arch: suboptimal  Ductal arch: normal  SVC: normal  IVC: normal  3-vessel view: suboptimal  3-vessel-trachea view: suboptimal  Cardiac position: normal  Cardiac axis: normal  Cardiac size: normal  Cardiac rhythm: normal  Rt lung: suboptimal  Lt lung: suboptimal  Diaphragm: normal  Cord insertion: suboptimal  Stomach: normal  Kidneys: suboptimal  Bladder: normal  Abdom. wall: suboptimal  Abdom. cavity: normal  Rt kidney: suboptimal  Lt kidney: suboptimal  Liver: normal  Bowel: normal  Cervical spine: suboptimal  Thoracic spine: suboptimal  Lumbar spine: suboptimal  Sacral spine: suboptimal  Arms: normal  Legs: normal  Rt arm: normal  Lt arm: normal  Rt hand: present  Lt hand: present  Rt leg: normal  Lt leg: normal  Rt foot: normal  Lt foot: normal  Position of hands: normal  Position of feet: normal  Gender: female  Wants to know gender: yes    Maternal Structures  ===============    Uterus / Cervix  Uterus: Normal  Cervical length 30.5 mm  Ovaries / Tubes / Adnexa  Rt ovary: Visualized  Lt ovary: Visualized  Other: Uterus and adnexa normal    Consultation  ==========    Type: Increased prolactin.  Ms. Harrison is a 34y/o  at 20 weeks based on a 7 week ultrasound who presents as a consult for elevated prolactin. We do not have  records from her outside physician but she was treated by an ob/gyn at [facility]. She was having difficulty conceiving and reports she had an  elevated prolactin in the 50s. She reports she had an MRI without evidence of a micro or macroadenoma. No report was provided. She was  placed on bromocriptine and conceived quickly and  this was stopped early in pregnancy. In early pregnancy value was 9 and repeat 3 months  later was 52. She has no visual symptoms.    PMHx: increased prolactin  in past she reports many years ago, one increased bp and was put on ace inhibitor and had reaction and stops-she never had any further HTN  or treatment  PSHx: cholecystectomy and D and C  Allergies: ace inhibitors, amoxicillin, bactrim, pcn, lisinopril (all anaphylaxis)  Social: denies smoking, alcohol, illicit drug use  Family history birth defects: None, no sickle cell  Meds: PNV  POBhx: 1 tab-early in pregnancy  1 sab 6 weeks  Quad negative  negative hgb elec  Hgba1c 5.3  Cr 0.6  Normal AST and ALT  MCV 77, Plt 330    A/P:  1. IUP at 20 weeks    2. Increased prolactin: The patient denies having a macro or microadenoma. Prolactin increases with pregnancy. Her values are within the  range for pregnancy. Recommend continued stoppage of bromocriptine. Discussed potential issues of using this medication in breastfeeding. If  visual symptoms, recommend MRI. Many authorities do not support checking prolactin levels due to their rise in pregnancy. However, some  indicate if remains below 400 that this is reassuring. If checked, consider checking every 3 months. Consider referral to ISSA or endocrine  postpartum. This is unlikely to affect patient during pregnancy. Patient should be followed for visual disturbances, diabetes insipidus, and  headaches. Visual field testing is recommended if symptoms. Adenomas can grow if present during pregnancy. Patient should be monitored for  any signs of pituatary apoplexy.    3. ? HTN in past: BP is normal today. She had one increased bp early in pregnancy. I would monitor patient closely. I would follow growth  scans q4-6 weeks and consider initiate weekly  fetal surveillance at 32 weeks (bpp or weekly NST/MVP). I would definitely begin if  signs of HTN. Dr. Pollard also encouraged baby asa. I would also check  baseline urine p/c ratio. If CHTN is noted, division guidelines should  be followed.    4. Obesity increases the risk of adverse outcomes.    5. Recommend checking iron studies with low MCV.    20 minutes was spent in face to face time with greater than half of that time spent in counseling and coordination of care.      Impression  =========    Sandoval live intrauterine pregnancy.  Biometry agrees with the clinical dating.  Normal amniotic fluid volume by qualitative assessment.  Some of the fetal anatomy was suboptimally visualized. The fetal anatomy that was seen today appeared normal.  Placenta is posterior. Distance from internal os is suboptimally visualized due to fetal leg position near placenta.  Transabdominal cervical length is normal.    Recommendation  ==============    Follow up ultrasound in 4 weeks for growth/placental location and suboptimally visualized fetal anatomy.

## 2018-08-08 ENCOUNTER — PATIENT MESSAGE (OUTPATIENT)
Dept: OBSTETRICS AND GYNECOLOGY | Facility: CLINIC | Age: 33
End: 2018-08-08

## 2018-08-10 ENCOUNTER — ROUTINE PRENATAL (OUTPATIENT)
Dept: OBSTETRICS AND GYNECOLOGY | Facility: CLINIC | Age: 33
End: 2018-08-10
Payer: COMMERCIAL

## 2018-08-10 VITALS
SYSTOLIC BLOOD PRESSURE: 145 MMHG | BODY MASS INDEX: 38.48 KG/M2 | DIASTOLIC BLOOD PRESSURE: 79 MMHG | WEIGHT: 238.44 LBS

## 2018-08-10 DIAGNOSIS — Z3A.21 21 WEEKS GESTATION OF PREGNANCY: Primary | ICD-10-CM

## 2018-08-10 PROCEDURE — 0502F SUBSEQUENT PRENATAL CARE: CPT | Mod: S$GLB,,, | Performed by: OBSTETRICS & GYNECOLOGY

## 2018-08-10 PROCEDURE — 99999 PR PBB SHADOW E&M-EST. PATIENT-LVL II: CPT | Mod: PBBFAC,,, | Performed by: OBSTETRICS & GYNECOLOGY

## 2018-08-11 NOTE — PROGRESS NOTES
21w2d here for OB visit.  Pregnancy complicated by h/o prolactinemia and obesity.    Pt has no other complaints today.  Reports active fetus.  Denies vaginal bleeding, leakage of fluid, or contractions.    Initial BP at last visit was elevated 145/79.  Repeat /72, P68, R18  Pt again has not start home BP monitoring, pt was encourage do start home BP monitoring BID, BP parameters to call reviewed.  Encourage daily ASA therapy for prevention of preE.  Encourage healthy lifestyle modifications.    Pt seen [facility] for anatomy ultrasound and consult for prolactinemia, appreciate recommendation.  Pt denies neuro sxs.  Neuro precautions.    Labor/preE precautions.  Return 1 wk for BP check.  Voiced understanding.

## 2018-08-13 ENCOUNTER — TELEPHONE (OUTPATIENT)
Dept: OBSTETRICS AND GYNECOLOGY | Facility: CLINIC | Age: 33
End: 2018-08-13

## 2018-08-13 NOTE — TELEPHONE ENCOUNTER
----- Message from Kerry Stratton sent at 8/13/2018  9:25 AM CDT -----  Contact: self  Pt calling to make an appointment, was told to come before Wednesday due to high blood pressure. She's pregnant, But we have no openings Please call at [phone]  -----------------------------------------------------------  8/13/18 @ 1031 (JUANITA)  SPOKE WITH MS CORONEL, APPT MADE FOR 8/14/18 @8442  WITH DR BABCOCK FOR FOLLOW ABOUT HER BLOOD PRESSURE

## 2018-08-14 ENCOUNTER — ROUTINE PRENATAL (OUTPATIENT)
Dept: OBSTETRICS AND GYNECOLOGY | Facility: CLINIC | Age: 33
End: 2018-08-14
Payer: COMMERCIAL

## 2018-08-14 VITALS
WEIGHT: 235.31 LBS | BODY MASS INDEX: 37.99 KG/M2 | DIASTOLIC BLOOD PRESSURE: 74 MMHG | SYSTOLIC BLOOD PRESSURE: 122 MMHG

## 2018-08-14 DIAGNOSIS — Z3A.21 21 WEEKS GESTATION OF PREGNANCY: Primary | ICD-10-CM

## 2018-08-14 PROCEDURE — 0502F SUBSEQUENT PRENATAL CARE: CPT | Mod: S$GLB,,, | Performed by: OBSTETRICS & GYNECOLOGY

## 2018-08-14 PROCEDURE — 99999 PR PBB SHADOW E&M-EST. PATIENT-LVL II: CPT | Mod: PBBFAC,,, | Performed by: OBSTETRICS & GYNECOLOGY

## 2018-08-14 NOTE — PROGRESS NOTES
21w6d here for OB visit.  Pregnancy complicated by h/o prolactinemia and obesity.    Pt reports tension HA after long day after work.  Denies vision changes, epigastric pain, or acute swelling.  Neuro exam grossly normal.  Visual field normal.  Supportive care measures discussed.  Neuro precautions.    Reports active fetus.  Denies vaginal bleeding, leakage of fluid, or contractions.    Pt advised to start home BP monitoring BID, BP parameters to call reviewed.  Encourage daily ASA therapy for prevention of preE.  Encourage healthy lifestyle modifications.    Order 1 hr glucola, CBC.  Labor/preE/neuro precautions.  Return 4 wks or sooner prn.  Voiced understanding.

## 2018-08-28 DIAGNOSIS — O26.90 PREGNANCY, COMPLICATIONS OF: Primary | ICD-10-CM

## 2018-08-29 ENCOUNTER — HOSPITAL ENCOUNTER (OUTPATIENT)
Dept: PERINATAL CARE | Facility: HOSPITAL | Age: 33
Discharge: HOME | End: 2018-08-29
Attending: OBSTETRICS & GYNECOLOGY
Payer: COMMERCIAL

## 2018-08-29 VITALS
BODY MASS INDEX: 39.11 KG/M2 | DIASTOLIC BLOOD PRESSURE: 66 MMHG | SYSTOLIC BLOOD PRESSURE: 136 MMHG | WEIGHT: 242.31 LBS

## 2018-08-29 DIAGNOSIS — R79.89 INCREASED PROLACTIN LEVEL: ICD-10-CM

## 2018-08-29 DIAGNOSIS — O26.90 PREGNANCY, COMPLICATIONS OF: ICD-10-CM

## 2018-08-29 PROCEDURE — 76811 OB US DETAILED SNGL FETUS: CPT

## 2018-08-29 PROCEDURE — 99212 OFFICE O/P EST SF 10 MIN: CPT | Mod: 25,,, | Performed by: OBSTETRICS & GYNECOLOGY

## 2018-08-29 PROCEDURE — 76816 OB US FOLLOW-UP PER FETUS: CPT | Mod: 26,,, | Performed by: OBSTETRICS & GYNECOLOGY

## 2018-08-29 NOTE — ADDENDUM NOTE
Encounter addended by: Joslyn Maldonado MD on: 8/29/2018 1:53 PM   Actions taken: Sign clinical note

## 2018-08-29 NOTE — PROGRESS NOTES
Indication  ========    Follow-up evaluation of anatomy. Evaluation of fetal growth.    History  ======    Risk Factors  Details: Increased Prolactin    Pregnancy History  ==============    Maternal Lab Tests  Test: quad screen  Result:  -DSR 1:40,000    Result:  -T18 1:1700    Wants to know gender: yes    Maternal Assessment  =================    Weight 110 kg  Weight (lb) 242 lb  BP syst 136 mmHg  BP diast 66 mmHg    Method  ======    Transabdominal ultrasound examination.    Pregnancy  =========    Sandoval pregnancy. Number of fetuses: 1.    Dating  ======    Cycle: regular cycle  Ultrasound examination on: 8/29/2018  GA by U/S based upon: AC, BPD, Femur, HC  GA by U/S 24 w + 0 d  JAMES by U/S: 12/19/2018  Assigned: Dating performed on 05/7/2018, based on ultrasound (CRL)  Assigned GA 24 w + 0 d  Assigned JAMES: 12/19/2018    General Evaluation  ==============    Cardiac activity: present.  bpm.  Fetal movements: visualized.  Presentation: cephalic.  Placenta: Fundal.  Amniotic fluid: MVP 4.7 cm.    Fetal Biometry  ============    Fetal Biometry  BPD 59.9 mm 24w 3d Hadlock  OFD 78.2 mm 25w 4d Daniel  .6 mm 24w 2d Hadlock  .5 mm 23w 5d Hadlock  Femur 42.3 mm 23w 6d Hadlock   g 38% Mahamed  Calculated by: Hadlock (BPD-HC-AC-FL)  EFW (lb) 1 lb  EFW (oz) 6 oz  Cephalic index 0.77  HC / AC 1.17  FL / BPD 0.71  FL / AC 0.22  MVP 4.7 cm   bpm    Fetal Anatomy  ===========    Cranium: normal  RVOT: normal  4-chamber view: septum normal, 4-chamber previously documented  Aortic arch: suboptimal  3-vessel view: normal  3-vessel-trachea view: normal  Rt lung: suboptimal  Lt lung: suboptimal  Diaphragm: normal  Cord insertion: normal  Stomach: visualized  Kidneys: normal  Bladder: normal  Abdom. wall: normal  Rt renal artery: normal  Lt renal artery: normal  Cervical spine: normal sagittal views, suboptimal transverse  Thoracic spine: normal sagittal views, suboptimal transverse  Lumbar  spine: normal sagittal views, suboptimal transverse  Sacral spine: normal sagittal views, suboptimal transverse  Gender: female  Wants to know gender: yes  Other: A full anatomic survey has been previously performed.    Consultation  ==========    Type: Followup.  Patient without complaints.  Denies any visual symptoms or concerns of high prolactin.  BP normal today. Reports normal bp at home.  In reviewing chart, one increased bp noted since last visit. Recommend baseline assessment of urine protein as per prior recommendations  through primary ob office.  Patient had headaches at prior visits with ob-did not report any to me-if any concerns at follow up visits-consider optho for visual field testing and MRI and could consider  rechecking prolactin.  Follow bp closely.  See prior consult for recommendations.    10 minutes was spent in face to face time with greater than half of that time spent in counseling and coordination of care.    Impression  =========    Sandoval live intrauterine pregnancy.  Overall normal fetal growth.  Normal amniotic fluid volume.  Some of the fetal anatomy remains suboptimally visualized. The fetal anatomy that was seen appeared normal.  Placenta is posterior/fundal without previa.    Recommendation  ==============    Follow up ultrasound in 5 weeks for growth and suboptimal anatomy.

## 2018-09-06 ENCOUNTER — LAB VISIT (OUTPATIENT)
Dept: LAB | Facility: HOSPITAL | Age: 33
End: 2018-09-06
Attending: OBSTETRICS & GYNECOLOGY
Payer: COMMERCIAL

## 2018-09-06 DIAGNOSIS — Z3A.21 21 WEEKS GESTATION OF PREGNANCY: ICD-10-CM

## 2018-09-06 LAB
BASOPHILS # BLD AUTO: 0.01 K/UL
BASOPHILS NFR BLD: 0.1 %
DIFFERENTIAL METHOD: ABNORMAL
EOSINOPHIL # BLD AUTO: 0.1 K/UL
EOSINOPHIL NFR BLD: 0.6 %
ERYTHROCYTE [DISTWIDTH] IN BLOOD BY AUTOMATED COUNT: 14.3 %
GLUCOSE SERPL-MCNC: 131 MG/DL
HCT VFR BLD AUTO: 30.6 %
HGB BLD-MCNC: 9.9 G/DL
LYMPHOCYTES # BLD AUTO: 2.1 K/UL
LYMPHOCYTES NFR BLD: 22.4 %
MCH RBC QN AUTO: 26 PG
MCHC RBC AUTO-ENTMCNC: 32.4 G/DL
MCV RBC AUTO: 80 FL
MONOCYTES # BLD AUTO: 0.5 K/UL
MONOCYTES NFR BLD: 4.8 %
NEUTROPHILS # BLD AUTO: 6.7 K/UL
NEUTROPHILS NFR BLD: 72.1 %
PLATELET # BLD AUTO: 357 K/UL
PMV BLD AUTO: 10 FL
RBC # BLD AUTO: 3.81 M/UL
WBC # BLD AUTO: 9.36 K/UL

## 2018-09-06 PROCEDURE — 36415 COLL VENOUS BLD VENIPUNCTURE: CPT

## 2018-09-06 PROCEDURE — 82950 GLUCOSE TEST: CPT

## 2018-09-06 PROCEDURE — 85025 COMPLETE CBC W/AUTO DIFF WBC: CPT

## 2018-09-07 ENCOUNTER — ROUTINE PRENATAL (OUTPATIENT)
Dept: OBSTETRICS AND GYNECOLOGY | Facility: CLINIC | Age: 33
End: 2018-09-07
Payer: COMMERCIAL

## 2018-09-07 VITALS
WEIGHT: 243.06 LBS | BODY MASS INDEX: 39.23 KG/M2 | SYSTOLIC BLOOD PRESSURE: 122 MMHG | DIASTOLIC BLOOD PRESSURE: 64 MMHG

## 2018-09-07 DIAGNOSIS — Z3A.25 25 WEEKS GESTATION OF PREGNANCY: Primary | ICD-10-CM

## 2018-09-07 PROCEDURE — 99999 PR PBB SHADOW E&M-EST. PATIENT-LVL II: CPT | Mod: PBBFAC,,, | Performed by: OBSTETRICS & GYNECOLOGY

## 2018-09-07 PROCEDURE — 0502F SUBSEQUENT PRENATAL CARE: CPT | Mod: S$GLB,,, | Performed by: OBSTETRICS & GYNECOLOGY

## 2018-09-07 RX ORDER — FERROUS SULFATE 325(65) MG
325 TABLET ORAL 2 TIMES DAILY
Qty: 60 TABLET | Refills: 1 | Status: SHIPPED | OUTPATIENT
Start: 2018-09-07 | End: 2019-08-05

## 2018-09-07 RX ORDER — DOCUSATE SODIUM 100 MG/1
100 CAPSULE, LIQUID FILLED ORAL 2 TIMES DAILY
Qty: 60 CAPSULE | Refills: 1 | Status: SHIPPED | OUTPATIENT
Start: 2018-09-07 | End: 2019-08-05

## 2018-09-08 NOTE — PROGRESS NOTES
25w2d here for OB visit.  Pregnancy complicated by h/o prolactinemia and obesity.    Pt has no major complaints today.  Reports active fetus.  Denies vaginal bleeding, leakage of fluid, or contractions.  Denies HA, vision changes.    Pt advised to start home BP monitoring BID, BP parameters to call reviewed.  Encourage daily ASA therapy for prevention of preE.  Encourage healthy lifestyle modifications.    1 hr glucola 131.  Encourage healthy lifestyle modifications.  Start fergon/colace for anemia.  Labor/preE/neuro precautions.  Return 4 wks or sooner prn.  Voiced understanding.

## 2018-09-24 ENCOUNTER — HOSPITAL ENCOUNTER (OUTPATIENT)
Dept: PERINATAL CARE | Facility: HOSPITAL | Age: 33
Discharge: HOME | End: 2018-09-24
Attending: OBSTETRICS & GYNECOLOGY
Payer: COMMERCIAL

## 2018-09-24 DIAGNOSIS — O26.90 PREGNANCY, COMPLICATIONS OF: ICD-10-CM

## 2018-09-24 PROCEDURE — 76816 OB US FOLLOW-UP PER FETUS: CPT

## 2018-09-24 PROCEDURE — 99212 OFFICE O/P EST SF 10 MIN: CPT | Mod: 25,,, | Performed by: OBSTETRICS & GYNECOLOGY

## 2018-09-24 PROCEDURE — 76816 OB US FOLLOW-UP PER FETUS: CPT | Mod: 26,,, | Performed by: OBSTETRICS & GYNECOLOGY

## 2018-09-24 NOTE — ADDENDUM NOTE
Encounter addended by: Mishel Zacarias RN on: 9/24/2018 3:31 PM   Actions taken: Charge Capture section accepted

## 2018-09-24 NOTE — PROGRESS NOTES
Indication  ========    Evaluation of fetal growth.    History  ======    Risk Factors  Details: Increased Prolactin    Pregnancy History  ==============    Maternal Lab Tests  Test: quad screen  Result:  -DSR 1:40,000    Result:  -T18 1:1700    Wants to know gender: yes    Maternal Assessment  =================    BP syst 134 mmHg  BP diast 70 mmHg    Method  ======    Transabdominal ultrasound examination.    Pregnancy  =========    Sandoval pregnancy. Number of fetuses: 1.    Dating  ======    Cycle: regular cycle  Ultrasound examination on: 9/24/2018  GA by U/S based upon: AC, BPD, Femur, HC  GA by U/S 27 w + 6 d  JAMES by U/S: 12/18/2018  Assigned: Dating performed on 05/7/2018, based on ultrasound (CRL)  Assigned GA 27 w + 5 d  Assigned JAMES: 12/19/2018    General Evaluation  ==============    Cardiac activity: present.  bpm.  Fetal movements: visualized.  Presentation: cephalic.  Placenta: posterior.  Amniotic fluid: MVP 4.1 cm.    Fetal Biometry  ============    Fetal Biometry  BPD 72.5 mm 29w 1d Hadlock  OFD 90.2 mm 29w 0d Daniel  .9 mm 28w 3d Hadlock  .5 mm 26w 5d Hadlock  Femur 51.1 mm 27w 3d Hadlock  EFW 1,044 g 36% Mahamed  Calculated by: Hadlock (BPD-HC-AC-FL)  EFW (lb) 2 lb  EFW (oz) 5 oz  Cephalic index 0.80  HC / AC 1.17  FL / BPD 0.70  FL / AC 0.23  MVP 4.1 cm   bpm    Fetal Anatomy  ===========    Cranium: normal  4-chamber view: documented previously  Aortic arch: normal  Rt lung: normal  Lt lung: normal  Stomach: normal  Kidneys: normal  Bladder: normal  Spine / Skelet.: TRV Spine WNL  Gender: female  Wants to know gender: yes  Other: A full anatomy survey previously performed.    Consultation  ==========    Follow up for fetal growth and prolactinoma.  Prior notes from Dr. Maldonado and prenatal record reviewed.  Denies any interval problems  Reports headaches have become less frequent. No visual sx. Pre-e labs all wnl  Last prolactin 52; MRI negative  /70    Fetal  growth at 36th %ile. Normal fluid.    Discussed reassuring findings.  Discussed that while her prolactin needs to be followed q 3 months, since no prior visible micro or macroadenoma unlikely to develop  significant symtpoms related to vision during gestation. Nonetheless, should be vigilant for any potential symptoms.    Impression  =========    Fetal size is AGA with the EFW at the 36th percentile.  Normal repeat limited fetal anatomic survey. AFV is normal.    Recommendation  ==============    Repeat ultrasound study as clinically indicated    Thank you again for allowing us to participate in the care of your patients. If you have any questions concerning today's consultation, feel free  to contact me or one of my partners. We can be reached at [phone] during normal business hours. If you have a question after normal  business hours, please contact Labor and Delivery at [phone].

## 2018-10-02 ENCOUNTER — PATIENT MESSAGE (OUTPATIENT)
Dept: OBSTETRICS AND GYNECOLOGY | Facility: CLINIC | Age: 33
End: 2018-10-02

## 2018-10-04 ENCOUNTER — ROUTINE PRENATAL (OUTPATIENT)
Dept: OBSTETRICS AND GYNECOLOGY | Facility: CLINIC | Age: 33
End: 2018-10-04
Payer: MEDICAID

## 2018-10-04 VITALS — DIASTOLIC BLOOD PRESSURE: 67 MMHG | SYSTOLIC BLOOD PRESSURE: 133 MMHG | WEIGHT: 238 LBS | BODY MASS INDEX: 38.41 KG/M2

## 2018-10-04 DIAGNOSIS — Z3A.29 29 WEEKS GESTATION OF PREGNANCY: Primary | ICD-10-CM

## 2018-10-04 PROCEDURE — 99213 OFFICE O/P EST LOW 20 MIN: CPT | Mod: TH,S$PBB,, | Performed by: OBSTETRICS & GYNECOLOGY

## 2018-10-04 PROCEDURE — 99212 OFFICE O/P EST SF 10 MIN: CPT | Mod: PBBFAC | Performed by: OBSTETRICS & GYNECOLOGY

## 2018-10-04 PROCEDURE — 99999 PR PBB SHADOW E&M-EST. PATIENT-LVL II: CPT | Mod: PBBFAC,,, | Performed by: OBSTETRICS & GYNECOLOGY

## 2018-10-04 NOTE — PROGRESS NOTES
29w1d here for OB visit.  Pregnancy complicated by h/o prolactinemia and obesity.  No major complaints today.  Reports active fetus.  Denies vaginal bleeding, leakage of fluid, or contractions.  Denies HA, vision changes.  Continue home BP monitoring, parameters to call reviewed.  Pt did not bring her home BP log, per recall reports BP < 140/90.  Encourage daily ASA therapy for prevention of preE.  Encourage healthy lifestyle modifications.  Start fergon/colace for anemia.  Labor/preE/neuro precautions.  F/u in 2 wks or sooner prn.  Voiced understanding.  Friend present for visit.

## 2018-10-15 ENCOUNTER — ROUTINE PRENATAL (OUTPATIENT)
Dept: OBSTETRICS AND GYNECOLOGY | Facility: CLINIC | Age: 33
End: 2018-10-15
Payer: MEDICAID

## 2018-10-15 VITALS — DIASTOLIC BLOOD PRESSURE: 76 MMHG | BODY MASS INDEX: 38.74 KG/M2 | WEIGHT: 240 LBS | SYSTOLIC BLOOD PRESSURE: 122 MMHG

## 2018-10-15 DIAGNOSIS — Z3A.30 30 WEEKS GESTATION OF PREGNANCY: Primary | ICD-10-CM

## 2018-10-15 PROCEDURE — 99212 OFFICE O/P EST SF 10 MIN: CPT | Mod: PBBFAC | Performed by: OBSTETRICS & GYNECOLOGY

## 2018-10-15 PROCEDURE — 99999 PR PBB SHADOW E&M-EST. PATIENT-LVL II: CPT | Mod: PBBFAC,,, | Performed by: OBSTETRICS & GYNECOLOGY

## 2018-10-15 PROCEDURE — 99213 OFFICE O/P EST LOW 20 MIN: CPT | Mod: TH,S$PBB,, | Performed by: OBSTETRICS & GYNECOLOGY

## 2018-10-15 NOTE — PROGRESS NOTES
30w5d here for OB visit.  Pregnancy complicated by h/o prolactinemia and obesity.    No major complaints today.  Reports active fetus.  Denies vaginal bleeding, leakage of fluid, or contractions.    Denies HA, vision changes.  Pt did not bring her home BP log, per recall reports BP < 140/90.  Continue home BP monitoring, parameters to call reviewed.  Encourage healthy lifestyle modifications.  Encourage daily ASA therapy for prevention of preE.    Start fergon/colace for anemia.  Labor/preE/neuro precautions.  Return in 2 wks or sooner prn.  Voiced understanding.  Significant other present for visit.

## 2018-10-30 ENCOUNTER — ROUTINE PRENATAL (OUTPATIENT)
Dept: OBSTETRICS AND GYNECOLOGY | Facility: CLINIC | Age: 33
End: 2018-10-30
Payer: MEDICAID

## 2018-10-30 VITALS
WEIGHT: 240.31 LBS | SYSTOLIC BLOOD PRESSURE: 122 MMHG | DIASTOLIC BLOOD PRESSURE: 74 MMHG | BODY MASS INDEX: 38.79 KG/M2

## 2018-10-30 DIAGNOSIS — Z3A.32 32 WEEKS GESTATION OF PREGNANCY: Primary | ICD-10-CM

## 2018-10-30 PROCEDURE — 99212 OFFICE O/P EST SF 10 MIN: CPT | Mod: PBBFAC | Performed by: OBSTETRICS & GYNECOLOGY

## 2018-10-30 PROCEDURE — 99213 OFFICE O/P EST LOW 20 MIN: CPT | Mod: TH,S$PBB,, | Performed by: OBSTETRICS & GYNECOLOGY

## 2018-10-30 PROCEDURE — 99999 PR PBB SHADOW E&M-EST. PATIENT-LVL II: CPT | Mod: PBBFAC,,, | Performed by: OBSTETRICS & GYNECOLOGY

## 2018-10-30 NOTE — PROGRESS NOTES
32w6d here for OB visit.  Pregnancy complicated by h/o prolactinemia and obesity.  Pt has no major complaints today.  Reports active fetus.  Denies vaginal bleeding, leakage of fluid, or contractions.  Denies HA, vision changes.  Pt did not bring her home BP log, per recall reports BP < 140/90.  Continue home BP monitoring, parameters to call reviewed.  Encourage healthy lifestyle modifications.  Encourage daily ASA therapy for prevention of preE.  Repeat prolactin, HIV, CBC today.  Continue fergon/colace for anemia.  Labor/preE/neuro precautions.  Return in 2 wks or sooner prn.  Voiced understanding.

## 2018-11-09 ENCOUNTER — LAB VISIT (OUTPATIENT)
Dept: LAB | Facility: HOSPITAL | Age: 33
End: 2018-11-09
Attending: OBSTETRICS & GYNECOLOGY
Payer: MEDICAID

## 2018-11-09 DIAGNOSIS — Z3A.32 32 WEEKS GESTATION OF PREGNANCY: ICD-10-CM

## 2018-11-09 LAB
BASOPHILS # BLD AUTO: 0.01 K/UL
BASOPHILS NFR BLD: 0.1 %
DIFFERENTIAL METHOD: ABNORMAL
EOSINOPHIL # BLD AUTO: 0.1 K/UL
EOSINOPHIL NFR BLD: 0.8 %
ERYTHROCYTE [DISTWIDTH] IN BLOOD BY AUTOMATED COUNT: 14.9 %
HCT VFR BLD AUTO: 31.5 %
HGB BLD-MCNC: 10 G/DL
LYMPHOCYTES # BLD AUTO: 1.9 K/UL
LYMPHOCYTES NFR BLD: 22.5 %
MCH RBC QN AUTO: 24.5 PG
MCHC RBC AUTO-ENTMCNC: 31.7 G/DL
MCV RBC AUTO: 77 FL
MONOCYTES # BLD AUTO: 0.5 K/UL
MONOCYTES NFR BLD: 6.3 %
NEUTROPHILS # BLD AUTO: 5.8 K/UL
NEUTROPHILS NFR BLD: 69.7 %
PLATELET # BLD AUTO: 417 K/UL
PMV BLD AUTO: 10 FL
PROLACTIN SERPL IA-MCNC: 190.9 NG/ML
RBC # BLD AUTO: 4.08 M/UL
WBC # BLD AUTO: 8.39 K/UL

## 2018-11-09 PROCEDURE — 85025 COMPLETE CBC W/AUTO DIFF WBC: CPT

## 2018-11-09 PROCEDURE — 86703 HIV-1/HIV-2 1 RESULT ANTBDY: CPT

## 2018-11-09 PROCEDURE — 84146 ASSAY OF PROLACTIN: CPT

## 2018-11-12 LAB — HIV 1+2 AB+HIV1 P24 AG SERPL QL IA: NEGATIVE

## 2018-11-13 ENCOUNTER — ROUTINE PRENATAL (OUTPATIENT)
Dept: OBSTETRICS AND GYNECOLOGY | Facility: CLINIC | Age: 33
End: 2018-11-13
Payer: MEDICAID

## 2018-11-13 VITALS — WEIGHT: 248 LBS | SYSTOLIC BLOOD PRESSURE: 112 MMHG | DIASTOLIC BLOOD PRESSURE: 70 MMHG | BODY MASS INDEX: 40.03 KG/M2

## 2018-11-13 DIAGNOSIS — Z3A.34 34 WEEKS GESTATION OF PREGNANCY: Primary | ICD-10-CM

## 2018-11-13 PROCEDURE — 99212 OFFICE O/P EST SF 10 MIN: CPT | Mod: PBBFAC | Performed by: OBSTETRICS & GYNECOLOGY

## 2018-11-13 PROCEDURE — 99999 PR PBB SHADOW E&M-EST. PATIENT-LVL II: CPT | Mod: PBBFAC,,, | Performed by: OBSTETRICS & GYNECOLOGY

## 2018-11-13 PROCEDURE — 99213 OFFICE O/P EST LOW 20 MIN: CPT | Mod: TH,S$PBB,, | Performed by: OBSTETRICS & GYNECOLOGY

## 2018-11-13 NOTE — PROGRESS NOTES
34w6d here for OB visit.  Pregnancy complicated by h/o prolactinemia and obesity.    No major complaints today.  Reports active fetus.  Denies vaginal bleeding, leakage of fluid, or contractions.  Denies HA, vision changes.    Pt did not bring her home BP log, per recall reports BP < 140/90.  Continue home BP monitoring, parameters to call reviewed.  Encourage healthy lifestyle modifications.  Encourage daily ASA therapy for prevention of preE.    Repeat prolactin mild elevated since last level.  Component      Latest Ref Rng & Units 11/9/2018 7/5/2018 4/9/2018   Prolactin      5.2 - 26.5 ng/mL 190.9 (H) 52.1 (H) 9.0   During normal pregnancy, serum prolactin concentrations increase to as high as 400 ng/mL.  Pt asxs, neuro exam normal, no visual deficits or HA.  No further workup likely needed at this time unless pt becomes symptomatic.  F/u prolactin level after delivery.    Continue fergon/colace for anemia.  Labor/preE/neuro precautions.  Return 2 wks.  Voiced understanding.

## 2018-11-29 ENCOUNTER — ROUTINE PRENATAL (OUTPATIENT)
Dept: OBSTETRICS AND GYNECOLOGY | Facility: CLINIC | Age: 33
End: 2018-11-29
Payer: MEDICAID

## 2018-11-29 VITALS
SYSTOLIC BLOOD PRESSURE: 126 MMHG | DIASTOLIC BLOOD PRESSURE: 78 MMHG | BODY MASS INDEX: 39.91 KG/M2 | WEIGHT: 247.25 LBS

## 2018-11-29 DIAGNOSIS — Z3A.37 37 WEEKS GESTATION OF PREGNANCY: Primary | ICD-10-CM

## 2018-11-29 PROCEDURE — 87081 CULTURE SCREEN ONLY: CPT

## 2018-11-29 PROCEDURE — 99213 OFFICE O/P EST LOW 20 MIN: CPT | Mod: PBBFAC | Performed by: OBSTETRICS & GYNECOLOGY

## 2018-11-29 PROCEDURE — 99999 PR PBB SHADOW E&M-EST. PATIENT-LVL III: CPT | Mod: PBBFAC,,, | Performed by: OBSTETRICS & GYNECOLOGY

## 2018-11-29 PROCEDURE — 99213 OFFICE O/P EST LOW 20 MIN: CPT | Mod: TH,S$PBB,, | Performed by: OBSTETRICS & GYNECOLOGY

## 2018-11-29 NOTE — PROGRESS NOTES
37w1d here for OB visit.    Pregnancy complicated by h/o prolactinemia and obesity.    Pt has no major complaints today.  Reports active fetus.  Denies vaginal bleeding, leakage of fluid, or contractions.  Denies HA, vision changes.    Pt did not bring her home BP log, per recall reports BP < 140/90.  Continue home BP monitoring, parameters to call reviewed.  Encourage healthy lifestyle modifications.  Encourage daily ASA therapy for prevention of preE.    Order GBS.  Delivery consents signed.    Continue fergon/colace for anemia.  Labor/preE/neuro precautions.  F/u 1 wk.  Voiced understanding.

## 2018-12-01 ENCOUNTER — HOSPITAL ENCOUNTER (INPATIENT)
Facility: HOSPITAL | Age: 33
LOS: 3 days | Discharge: HOME | End: 2018-12-04
Attending: OBSTETRICS & GYNECOLOGY | Admitting: OBSTETRICS & GYNECOLOGY
Payer: MEDICAID

## 2018-12-01 ENCOUNTER — ANESTHESIA (OUTPATIENT)
Dept: OBSTETRICS AND GYNECOLOGY | Facility: HOSPITAL | Age: 33
End: 2018-12-01
Payer: MEDICAID

## 2018-12-01 ENCOUNTER — ANESTHESIA EVENT (OUTPATIENT)
Dept: OBSTETRICS AND GYNECOLOGY | Facility: HOSPITAL | Age: 33
End: 2018-12-01
Payer: MEDICAID

## 2018-12-01 DIAGNOSIS — Z3A.37 PREGNANCY WITH 37 WEEKS COMPLETED GESTATION: ICD-10-CM

## 2018-12-01 DIAGNOSIS — O42.90 AMNIOTIC FLUID LEAKING: ICD-10-CM

## 2018-12-01 DIAGNOSIS — Z3A.37 37 WEEKS GESTATION OF PREGNANCY: Primary | ICD-10-CM

## 2018-12-01 PROBLEM — O09.299 HISTORY OF MISCARRIAGE, CURRENTLY PREGNANT: Status: ACTIVE | Noted: 2018-12-01

## 2018-12-01 LAB
ABO + RH BLD: NORMAL
BACTERIA SPEC AEROBE CULT: NORMAL
BASOPHILS # BLD AUTO: 0.01 K/UL
BASOPHILS NFR BLD: 0.1 %
BLD GP AB SCN CELLS X3 SERPL QL: NORMAL
DIFFERENTIAL METHOD: ABNORMAL
EOSINOPHIL # BLD AUTO: 0 K/UL
EOSINOPHIL NFR BLD: 0.1 %
ERYTHROCYTE [DISTWIDTH] IN BLOOD BY AUTOMATED COUNT: 15.7 %
HCT VFR BLD AUTO: 33.4 %
HGB BLD-MCNC: 10.6 G/DL
LYMPHOCYTES # BLD AUTO: 2 K/UL
LYMPHOCYTES NFR BLD: 22.8 %
MCH RBC QN AUTO: 24.8 PG
MCHC RBC AUTO-ENTMCNC: 31.7 G/DL
MCV RBC AUTO: 78 FL
MONOCYTES # BLD AUTO: 0.4 K/UL
MONOCYTES NFR BLD: 5.1 %
NEUTROPHILS # BLD AUTO: 6.3 K/UL
NEUTROPHILS NFR BLD: 71.9 %
PLATELET # BLD AUTO: 395 K/UL
PMV BLD AUTO: 10.1 FL
RBC # BLD AUTO: 4.28 M/UL
RUPTURE OF MEMBRANE: POSITIVE
WBC # BLD AUTO: 8.69 K/UL

## 2018-12-01 PROCEDURE — 25000003 PHARM REV CODE 250: Performed by: OBSTETRICS & GYNECOLOGY

## 2018-12-01 PROCEDURE — 63600175 PHARM REV CODE 636 W HCPCS: Performed by: OBSTETRICS & GYNECOLOGY

## 2018-12-01 PROCEDURE — 84112 EVAL AMNIOTIC FLUID PROTEIN: CPT

## 2018-12-01 PROCEDURE — 11000001 HC ACUTE MED/SURG PRIVATE ROOM

## 2018-12-01 PROCEDURE — 62326 NJX INTERLAMINAR LMBR/SAC: CPT | Performed by: ANESTHESIOLOGY

## 2018-12-01 PROCEDURE — 25000003 PHARM REV CODE 250: Performed by: ANESTHESIOLOGY

## 2018-12-01 PROCEDURE — 59025 FETAL NON-STRESS TEST: CPT | Mod: 26,,, | Performed by: OBSTETRICS & GYNECOLOGY

## 2018-12-01 PROCEDURE — 86592 SYPHILIS TEST NON-TREP QUAL: CPT

## 2018-12-01 PROCEDURE — 85025 COMPLETE CBC W/AUTO DIFF WBC: CPT

## 2018-12-01 PROCEDURE — 86850 RBC ANTIBODY SCREEN: CPT

## 2018-12-01 PROCEDURE — 27800516 HC TRAY, EPIDURAL COMBO: Performed by: ANESTHESIOLOGY

## 2018-12-01 PROCEDURE — 27200710 HC EPIDURAL INFUSION PUMP SET: Performed by: ANESTHESIOLOGY

## 2018-12-01 PROCEDURE — 59409 OBSTETRICAL CARE: CPT | Mod: AA,,, | Performed by: ANESTHESIOLOGY

## 2018-12-01 RX ORDER — TERBUTALINE SULFATE 1 MG/ML
0.25 INJECTION SUBCUTANEOUS
Status: DISCONTINUED | OUTPATIENT
Start: 2018-12-01 | End: 2018-12-02

## 2018-12-01 RX ORDER — BUTORPHANOL TARTRATE 2 MG/ML
1 INJECTION INTRAMUSCULAR; INTRAVENOUS EVERY 4 HOURS PRN
Status: DISCONTINUED | OUTPATIENT
Start: 2018-12-01 | End: 2018-12-02

## 2018-12-01 RX ORDER — OXYTOCIN/RINGER'S LACTATE 20/1000 ML
41.7 PLASTIC BAG, INJECTION (ML) INTRAVENOUS CONTINUOUS
Status: ACTIVE | OUTPATIENT
Start: 2018-12-01 | End: 2018-12-01

## 2018-12-01 RX ORDER — BUPIVACAINE HYDROCHLORIDE 2.5 MG/ML
INJECTION, SOLUTION EPIDURAL; INFILTRATION; INTRACAUDAL
Status: DISCONTINUED | OUTPATIENT
Start: 2018-12-01 | End: 2018-12-02

## 2018-12-01 RX ORDER — SODIUM CHLORIDE, SODIUM LACTATE, POTASSIUM CHLORIDE, CALCIUM CHLORIDE 600; 310; 30; 20 MG/100ML; MG/100ML; MG/100ML; MG/100ML
INJECTION, SOLUTION INTRAVENOUS CONTINUOUS
Status: DISCONTINUED | OUTPATIENT
Start: 2018-12-01 | End: 2018-12-02

## 2018-12-01 RX ORDER — OXYTOCIN/RINGER'S LACTATE 20/1000 ML
2 PLASTIC BAG, INJECTION (ML) INTRAVENOUS CONTINUOUS
Status: DISCONTINUED | OUTPATIENT
Start: 2018-12-02 | End: 2018-12-01

## 2018-12-01 RX ORDER — MISOPROSTOL 100 MCG
25 TABLET ORAL EVERY 4 HOURS
Status: DISCONTINUED | OUTPATIENT
Start: 2018-12-01 | End: 2018-12-01

## 2018-12-01 RX ORDER — ONDANSETRON 8 MG/1
8 TABLET, ORALLY DISINTEGRATING ORAL EVERY 8 HOURS PRN
Status: DISCONTINUED | OUTPATIENT
Start: 2018-12-01 | End: 2018-12-02

## 2018-12-01 RX ORDER — FENTANYL/BUPIVACAINE/NS/PF 2MCG/ML-.1
PLASTIC BAG, INJECTION (ML) INJECTION CONTINUOUS PRN
Status: DISCONTINUED | OUTPATIENT
Start: 2018-12-01 | End: 2018-12-02

## 2018-12-01 RX ORDER — SODIUM CHLORIDE 9 MG/ML
INJECTION, SOLUTION INTRAVENOUS
Status: DISCONTINUED | OUTPATIENT
Start: 2018-12-01 | End: 2018-12-02

## 2018-12-01 RX ORDER — OXYTOCIN/RINGER'S LACTATE 20/1000 ML
333 PLASTIC BAG, INJECTION (ML) INTRAVENOUS CONTINUOUS
Status: DISCONTINUED | OUTPATIENT
Start: 2018-12-01 | End: 2018-12-01

## 2018-12-01 RX ORDER — OXYTOCIN/RINGER'S LACTATE 20/1000 ML
2 PLASTIC BAG, INJECTION (ML) INTRAVENOUS CONTINUOUS
Status: DISCONTINUED | OUTPATIENT
Start: 2018-12-01 | End: 2018-12-02

## 2018-12-01 RX ADMIN — SODIUM CHLORIDE, SODIUM LACTATE, POTASSIUM CHLORIDE, AND CALCIUM CHLORIDE: .6; .31; .03; .02 INJECTION, SOLUTION INTRAVENOUS at 02:12

## 2018-12-01 RX ADMIN — SODIUM CHLORIDE, SODIUM LACTATE, POTASSIUM CHLORIDE, AND CALCIUM CHLORIDE: .6; .31; .03; .02 INJECTION, SOLUTION INTRAVENOUS at 07:12

## 2018-12-01 RX ADMIN — BUPIVACAINE HYDROCHLORIDE 0.8 ML: 2.5 INJECTION, SOLUTION EPIDURAL; INFILTRATION; INTRACAUDAL; PERINEURAL at 08:12

## 2018-12-01 RX ADMIN — PROMETHAZINE HYDROCHLORIDE 12.5 MG: 25 INJECTION INTRAMUSCULAR; INTRAVENOUS at 05:12

## 2018-12-01 RX ADMIN — BUTORPHANOL TARTRATE 1 MG: 2 INJECTION, SOLUTION INTRAMUSCULAR; INTRAVENOUS at 05:12

## 2018-12-01 RX ADMIN — Medication 10 ML/HR: at 08:12

## 2018-12-01 RX ADMIN — Medication 25 MCG: at 02:12

## 2018-12-01 NOTE — SUBJECTIVE & OBJECTIVE
Obstetric HPI:  Patient reports None contractions, active fetal movement, No vaginal bleeding , Yes loss of fluid     This pregnancy has been complicated by no significant complications.    Obstetric History       T0      L0     SAB1   TAB0   Ectopic0   Multiple0   Live Births0       # Outcome Date GA Lbr Raul/2nd Weight Sex Delivery Anes PTL Lv   3 Current            2 SAB            1 AB                 Past Medical History:   Diagnosis Date         Obesity      Past Surgical History:   Procedure Laterality Date    CHOLECYSTECTOMY      D & C (SUCTION) N/A 3/24/2015    Performed by [facility] at [facility]    DILATION AND CURETTAGE OF UTERUS      GALLBLADDER SURGERY         PTA Medications   Medication Sig    aspirin (ECOTRIN) 81 MG EC tablet Take 81 mg by mouth once daily.    docusate sodium (COLACE) 100 MG capsule Take 1 capsule (100 mg total) by mouth 2 (two) times daily.    ferrous sulfate 325 mg (65 mg iron) Tab tablet Take 1 tablet (325 mg total) by mouth 2 (two) times daily.    prenat.vits,krish,min-iron-folic (PRENATAL VITAMIN) Tab Take 1 tablet by mouth once daily.    PRENATAL 28 mg iron- 800 mcg Tab TK 1 T PO ONCE D       Review of patient's allergies indicates:   Allergen Reactions    Ace inhibitors Anaphylaxis    Amoxicillin Anaphylaxis    Bactrim [sulfamethoxazole-trimethoprim] Anaphylaxis    Lisinopril Anaphylaxis    Penicillins Anaphylaxis        Family History     None        Tobacco Use    Smoking status: Never Smoker    Smokeless tobacco: Never Used   Substance and Sexual Activity    Alcohol use: No    Drug use: No    Sexual activity: Yes     Partners: Male     Review of Systems   Constitutional: Negative.    HENT: Negative.    Respiratory: Negative.    Cardiovascular: Negative.    Gastrointestinal: Negative.    Endocrine:        Hx of hyperprolactinemia, no evidence currently     Genitourinary: Negative.    Musculoskeletal: Negative.     Neurological: Negative.    Hematological: Negative.    Psychiatric/Behavioral: Negative.    Breast: negative.       Objective:     Vital Signs (Most Recent):    Vital Signs (24h Range):        Weight: 112 kg (247 lb)  Body mass index is 39.87 kg/m².    FHT: 130, reactive, Cat 1 (reassuring)  TOCO:  none    Physical Exam:   Constitutional: She is oriented to person, place, and time. She appears well-developed and well-nourished. No distress.    HENT:   Head: Normocephalic and atraumatic.     Neck: Normal range of motion. No thyromegaly present.    Cardiovascular: Normal rate.     Pulmonary/Chest: Effort normal. No respiratory distress.        Abdominal: Soft. She exhibits no distension.             Musculoskeletal: Normal range of motion and moves all extremeties.       Neurological: She is alert and oriented to person, place, and time. No cranial nerve deficit. Coordination normal.    Skin: She is not diaphoretic.    Psychiatric: She has a normal mood and affect. Her behavior is normal. Judgment and thought content normal.       Cervix:  Dilation:  1  Effacement:  50%  Station: -3  Presentation: Vertex     Significant Labs:  Lab Results   Component Value Date    GROUPTRH A POS 12/01/2018    HEPBSAG Negative 05/07/2018    STREPBCULT No Group B Streptococcus isolated 11/29/2018       I have personallly reviewed all pertinent lab results from the last 24 hours.

## 2018-12-01 NOTE — NURSING
Presented to L & D with c/o rupture of membranes, rapid ROM test obtained, SVE done 1/th/-2. Sending test to determine ROM. Instructed on plan of care, verbalized understanding.

## 2018-12-01 NOTE — H&P
[facility]  Obstetrics  History & Physical    Patient Name: Ghanshyam Harrison  MRN: [id number]  Admission Date: 2018  Primary Care Provider: Jonel Pollard MD    Subjective:     Principal Problem:<principal problem not specified>    History of Present Illness:  33 yro  @ 37 wk 3 d presented to L&D for leaking of fluid.  ROM+ test confirmed rupture of membranes and pt admitted for labor augmentation.  Preg complicated by hx of miscarriage and obesity.  Pt has hx of hyperprolactinemia but pt currently on no meds and PRL was normal 2018 with initial OB labs.    Obstetric HPI:  Patient reports None contractions, active fetal movement, No vaginal bleeding , Yes loss of fluid     This pregnancy has been complicated by no significant complications.    Obstetric History       T0      L0     SAB1   TAB0   Ectopic0   Multiple0   Live Births0       # Outcome Date GA Lbr Raul/2nd Weight Sex Delivery Anes PTL Lv   3 Current            2 SAB            1 AB                 Past Medical History:   Diagnosis Date         Obesity      Past Surgical History:   Procedure Laterality Date    CHOLECYSTECTOMY      D & C (SUCTION) N/A 3/24/2015    Performed by [facility] at [facility] OR    DILATION AND CURETTAGE OF UTERUS      GALLBLADDER SURGERY         PTA Medications   Medication Sig    aspirin (ECOTRIN) 81 MG EC tablet Take 81 mg by mouth once daily.    docusate sodium (COLACE) 100 MG capsule Take 1 capsule (100 mg total) by mouth 2 (two) times daily.    ferrous sulfate 325 mg (65 mg iron) Tab tablet Take 1 tablet (325 mg total) by mouth 2 (two) times daily.    prenat.vits,krish,min-iron-folic (PRENATAL VITAMIN) Tab Take 1 tablet by mouth once daily.    PRENATAL 28 mg iron- 800 mcg Tab TK 1 T PO ONCE D       Review of patient's allergies indicates:   Allergen Reactions    Ace inhibitors Anaphylaxis    Amoxicillin Anaphylaxis    Bactrim  [sulfamethoxazole-trimethoprim] Anaphylaxis    Lisinopril Anaphylaxis    Penicillins Anaphylaxis        Family History     None        Tobacco Use    Smoking status: Never Smoker    Smokeless tobacco: Never Used   Substance and Sexual Activity    Alcohol use: No    Drug use: No    Sexual activity: Yes     Partners: Male     Review of Systems   Constitutional: Negative.    HENT: Negative.    Respiratory: Negative.    Cardiovascular: Negative.    Gastrointestinal: Negative.    Endocrine:        Hx of hyperprolactinemia, no evidence currently     Genitourinary: Negative.    Musculoskeletal: Negative.    Neurological: Negative.    Hematological: Negative.    Psychiatric/Behavioral: Negative.    Breast: negative.       Objective:     Vital Signs (Most Recent):    Vital Signs (24h Range):        Weight: 112 kg (247 lb)  Body mass index is 39.87 kg/m².    FHT: 130, reactive, Cat 1 (reassuring)  TOCO:  none    Physical Exam:   Constitutional: She is oriented to person, place, and time. She appears well-developed and well-nourished. No distress.    HENT:   Head: Normocephalic and atraumatic.     Neck: Normal range of motion. No thyromegaly present.    Cardiovascular: Normal rate.     Pulmonary/Chest: Effort normal. No respiratory distress.        Abdominal: Soft. She exhibits no distension.             Musculoskeletal: Normal range of motion and moves all extremeties.       Neurological: She is alert and oriented to person, place, and time. No cranial nerve deficit. Coordination normal.    Skin: She is not diaphoretic.    Psychiatric: She has a normal mood and affect. Her behavior is normal. Judgment and thought content normal.       Cervix:  Dilation:  1  Effacement:  50%  Station: -3  Presentation: Vertex     Significant Labs:  Lab Results   Component Value Date    GROUPTRH A POS 12/01/2018    HEPBSAG Negative 05/07/2018    STREPBCULT No Group B Streptococcus isolated 11/29/2018       I have personallly reviewed all  pertinent lab results from the last 24 hours.    Assessment/Plan:     33 y.o. female  at 37w3d for:    Amniotic fluid leaking    Pt admitted for labor augmentation         Claudio Hinds MD  Obstetrics  [facility]

## 2018-12-01 NOTE — NURSING
Dr. Hinds informed of presence and status, will call Dr. Pollard if test comes back positive for ROM.

## 2018-12-01 NOTE — HPI
33 yro  @ 37 wk 3 d presented to L&D for leaking of fluid.  ROM+ test confirmed rupture of membranes and pt admitted for labor augmentation.  Preg complicated by hx of miscarriage and obesity.  Pt has hx of hyperprolactinemia but pt currently on no meds and PRL was normal 2018 with initial OB labs.

## 2018-12-01 NOTE — PROGRESS NOTES
12/01/18 1734   TeleStork Benny Note - Strip   Strip Reviewed by [place] Nurse? Yes   TeleStork [facility]enny Note - Communication   [place] Nurse Communicated with Bedside Nurse Regarding: Fetal Status   TeleStork Benny Note - Notification   Nurse Notified? Yes   Name of Nurse spoke with Haley summers RN

## 2018-12-01 NOTE — PROGRESS NOTES
12/01/18 1456   TeleStork Benny Note - Strip   Strip Reviewed by Benny Nurse? Yes   TeleStork Benny Note - Communication   [place] Nurse Communicated with Bedside Nurse Regarding: Fetal Status   TeleStork Benny Note - Notification   Nurse Notified? Yes

## 2018-12-02 PROBLEM — Z3A.37 37 WEEKS GESTATION OF PREGNANCY: Status: ACTIVE | Noted: 2018-12-02

## 2018-12-02 PROBLEM — O42.90 AMNIOTIC FLUID LEAKING: Status: RESOLVED | Noted: 2018-12-01 | Resolved: 2018-12-02

## 2018-12-02 LAB
BASOPHILS # BLD AUTO: 0.01 K/UL
BASOPHILS NFR BLD: 0.1 %
DIFFERENTIAL METHOD: ABNORMAL
EOSINOPHIL # BLD AUTO: 0 K/UL
EOSINOPHIL NFR BLD: 0.1 %
ERYTHROCYTE [DISTWIDTH] IN BLOOD BY AUTOMATED COUNT: 15.6 %
HCT VFR BLD AUTO: 29 %
HGB BLD-MCNC: 9.5 G/DL
LYMPHOCYTES # BLD AUTO: 1.6 K/UL
LYMPHOCYTES NFR BLD: 11.3 %
MCH RBC QN AUTO: 25 PG
MCHC RBC AUTO-ENTMCNC: 32.8 G/DL
MCV RBC AUTO: 76 FL
MONOCYTES # BLD AUTO: 1.3 K/UL
MONOCYTES NFR BLD: 9.2 %
NEUTROPHILS # BLD AUTO: 11.1 K/UL
NEUTROPHILS NFR BLD: 79.3 %
PLATELET # BLD AUTO: 352 K/UL
PMV BLD AUTO: 10 FL
RBC # BLD AUTO: 3.8 M/UL
WBC # BLD AUTO: 13.97 K/UL

## 2018-12-02 PROCEDURE — 72200004 HC VAGINAL DELIVERY LEVEL I

## 2018-12-02 PROCEDURE — 36415 COLL VENOUS BLD VENIPUNCTURE: CPT

## 2018-12-02 PROCEDURE — 25000003 PHARM REV CODE 250: Performed by: OBSTETRICS & GYNECOLOGY

## 2018-12-02 PROCEDURE — 59409 OBSTETRICAL CARE: CPT | Mod: AT,,, | Performed by: OBSTETRICS & GYNECOLOGY

## 2018-12-02 PROCEDURE — 85025 COMPLETE CBC W/AUTO DIFF WBC: CPT

## 2018-12-02 PROCEDURE — 63600175 PHARM REV CODE 636 W HCPCS: Performed by: OBSTETRICS & GYNECOLOGY

## 2018-12-02 PROCEDURE — 11000001 HC ACUTE MED/SURG PRIVATE ROOM

## 2018-12-02 PROCEDURE — 0KQM0ZZ REPAIR PERINEUM MUSCLE, OPEN APPROACH: ICD-10-PCS | Performed by: OBSTETRICS & GYNECOLOGY

## 2018-12-02 PROCEDURE — 72100003 HC LABOR CARE, EA. ADDL. 8 HRS

## 2018-12-02 RX ORDER — HYDROCORTISONE 25 MG/G
CREAM TOPICAL 3 TIMES DAILY PRN
Status: DISCONTINUED | OUTPATIENT
Start: 2018-12-02 | End: 2018-12-04 | Stop reason: HOSPADM

## 2018-12-02 RX ORDER — OXYTOCIN/RINGER'S LACTATE 20/1000 ML
41.65 PLASTIC BAG, INJECTION (ML) INTRAVENOUS CONTINUOUS
Status: ACTIVE | OUTPATIENT
Start: 2018-12-02 | End: 2018-12-02

## 2018-12-02 RX ORDER — DIPHENHYDRAMINE HCL 25 MG
25 CAPSULE ORAL EVERY 4 HOURS PRN
Status: DISCONTINUED | OUTPATIENT
Start: 2018-12-02 | End: 2018-12-04 | Stop reason: HOSPADM

## 2018-12-02 RX ORDER — OXYCODONE AND ACETAMINOPHEN 5; 325 MG/1; MG/1
1 TABLET ORAL EVERY 4 HOURS PRN
Status: DISCONTINUED | OUTPATIENT
Start: 2018-12-02 | End: 2018-12-04 | Stop reason: HOSPADM

## 2018-12-02 RX ORDER — ONDANSETRON 8 MG/1
8 TABLET, ORALLY DISINTEGRATING ORAL EVERY 8 HOURS PRN
Status: DISCONTINUED | OUTPATIENT
Start: 2018-12-02 | End: 2018-12-04 | Stop reason: HOSPADM

## 2018-12-02 RX ORDER — IBUPROFEN 600 MG/1
600 TABLET ORAL EVERY 6 HOURS PRN
Status: DISCONTINUED | OUTPATIENT
Start: 2018-12-02 | End: 2018-12-04 | Stop reason: HOSPADM

## 2018-12-02 RX ORDER — DOCUSATE SODIUM 100 MG/1
200 CAPSULE, LIQUID FILLED ORAL 2 TIMES DAILY PRN
Status: DISCONTINUED | OUTPATIENT
Start: 2018-12-02 | End: 2018-12-04 | Stop reason: HOSPADM

## 2018-12-02 RX ORDER — SIMETHICONE 80 MG
1 TABLET,CHEWABLE ORAL EVERY 6 HOURS PRN
Status: DISCONTINUED | OUTPATIENT
Start: 2018-12-02 | End: 2018-12-04 | Stop reason: HOSPADM

## 2018-12-02 RX ORDER — OXYCODONE AND ACETAMINOPHEN 10; 325 MG/1; MG/1
1 TABLET ORAL EVERY 4 HOURS PRN
Status: DISCONTINUED | OUTPATIENT
Start: 2018-12-02 | End: 2018-12-04 | Stop reason: HOSPADM

## 2018-12-02 RX ORDER — MISOPROSTOL 200 UG/1
600 TABLET ORAL
Status: DISCONTINUED | OUTPATIENT
Start: 2018-12-02 | End: 2018-12-04 | Stop reason: HOSPADM

## 2018-12-02 RX ADMIN — OXYCODONE HYDROCHLORIDE AND ACETAMINOPHEN 0.5 TABLET: 10; 325 TABLET ORAL at 06:12

## 2018-12-02 RX ADMIN — Medication 333 MILLI-UNITS/MIN: at 12:12

## 2018-12-02 RX ADMIN — OXYCODONE HYDROCHLORIDE AND ACETAMINOPHEN 1 TABLET: 10; 325 TABLET ORAL at 05:12

## 2018-12-02 RX ADMIN — OXYCODONE AND ACETAMINOPHEN 1 TABLET: 5; 325 TABLET ORAL at 11:12

## 2018-12-02 RX ADMIN — IBUPROFEN 600 MG: 600 TABLET ORAL at 05:12

## 2018-12-02 RX ADMIN — IBUPROFEN 600 MG: 600 TABLET ORAL at 04:12

## 2018-12-02 RX ADMIN — IBUPROFEN 600 MG: 600 TABLET ORAL at 11:12

## 2018-12-02 NOTE — NURSING
Pt to room 242 via wheelchair, accompanied by ANA LAURA Chapman. Pt into bed with minimal assistance, call bell placed within reach.

## 2018-12-02 NOTE — ANESTHESIA POSTPROCEDURE EVALUATION
Anesthesia Post Evaluation    Patient: Ghanshyam Harrison    Procedure(s) Performed: * No procedures listed *    Final Anesthesia Type: CSE  Patient location during evaluation: labor & delivery  Patient participation: Yes- Able to Participate  Level of consciousness: awake and alert and oriented  Post-procedure vital signs: reviewed and stable  Pain management: adequate  Airway patency: patent  PONV status at discharge: No PONV  Anesthetic complications: no      Cardiovascular status: blood pressure returned to baseline and hemodynamically stable  Respiratory status: unassisted, spontaneous ventilation and room air  Hydration status: euvolemic  Follow-up not needed.        Visit Vitals  /66   Pulse (!) 120   Temp 37.6 °C (99.6 °F)   Resp 16   Wt 112 kg (247 lb)   LMP 02/27/2018 (Approximate)   SpO2 96%   Breastfeeding? Unknown   BMI 39.87 kg/m²       Pain/Lorene Score: Pain Rating Prior to Med Admin: 6 (12/2/2018  6:04 AM)

## 2018-12-02 NOTE — L&D DELIVERY NOTE
[facility]   Delivery Summary  Obstetrics & Gynecology       Date of Delivery:  2018        Preoperative Diagnosis:    Sandoval gestation at 37w4d in active labor    Postoperative Diagnosis:    Sandoval gestation at 37w4d s/p spontaneous vaginal delivery  Live infant  Procedure Performed:  Vaginal delivery    Indication (HPI):     See History & Physical for complete details.  In brief, Ghanshyam Harrison is a 33 y.o.  at 37w4d gestation presented to L&D with SROM and early labor.  Pt was admitted to L&D for active management of labor.  Pt progressed well through the active phase of labor and delivered a viable infant.  Maternal and fetal status was overall reassuring throughout the labor course. Pt was informed of the risks, benefits, alternatives and possible complications to a vaginal delivery.  Informed consent was obtained for delivery and blood transfusion.      Anesthesia:  Epidural     EBL:  200 mL with no replacements    Specimens:  Cord blood    Findings, Infant & Apgars:      Live female infant, APGAR 1 min: 9, 5 min: 9, transfer to well baby  Weight pending at time of note   SROM 2018  ~10AM with moderate, clear fluid, no odor, no intrapartum fever   Second degree midline laceration, repaired    Complications:  None    Delivery Summary:      Vaginal delivery of viable infant.  Infant delivered after 2 minutes of pushing.  No nuchal cord.  No shoulder dystocia.  2nd degree laceration repaired with 2-0 vicryl in usual fashion.  Episiotomy was not performed.  The infant was vigorous with a strong cry.  Cord blood was collected.   The placenta delivered spontaneously intact with three vessel cord.    Uterine massage and 20 units of Pitocin IV were given until the fundus was firm.    Hemostasis was noted.    No sponges were left in the vagina.   Sponge, lap, needle, and instrument counts were correct time two.   Mother and baby were bonding well at the end of the  delivery both in stable condition.   Findings and expectations were discussed with the patient.   All of pt questions were answered to her satisfaction, pt voiced understanding.      Jonel Pollard MD

## 2018-12-02 NOTE — NURSING
Pt called for RN, IV accidentally removed. Site cleaned and bandaged. Pt reports voiding, but did not measure output -- education given, pt verbalizes understanding.

## 2018-12-02 NOTE — NURSING
Viable female infant delivered vaginally. Second degree midline laceration, in repair by Dr. Pollard. Apgars 9/9. Please refer to OB provider delivery note.

## 2018-12-02 NOTE — ANESTHESIA PREPROCEDURE EVALUATION
12/01/2018  Ghanshyam Harrison is a 33 y.o., female.    Anesthesia Evaluation    I have reviewed the Patient Summary Reports.     I have reviewed the Medications.     Review of Systems  Anesthesia Hx:  No problems with previous Anesthesia    Social:  Non-Smoker, No Alcohol Use    Cardiovascular:   Exercise tolerance: good        Physical Exam  General:  Morbid Obesity    Airway/Jaw/Neck:  AIRWAY FINDINGS: Normal       Dental:  DENTAL FINDINGS: Normal   Chest/Lungs:  Chest/Lungs Clear    Heart/Vascular:  Heart Findings: Normal       Mental Status:  Mental Status Findings:  Cooperative, Alert and Oriented         Anesthesia Plan  Type of Anesthesia, risks & benefits discussed:  Anesthesia Type:  CSE  Patient's Preference:   Intra-op Monitoring Plan:   Intra-op Monitoring Plan Comments:   Post Op Pain Control Plan:   Post Op Pain Control Plan Comments:   Induction:   IV  Beta Blocker:  Patient is not currently on a Beta-Blocker (No further documentation required).       Informed Consent: Patient understands risks and agrees with Anesthesia plan.  Questions answered. Anesthesia consent signed with patient.  ASA Score: 2     Day of Surgery Review of History & Physical:    H&P update referred to the surgeon.         Ready For Surgery From Anesthesia Perspective.

## 2018-12-02 NOTE — NURSING
Upon shift change, patient reported feeling pain 10 out of 10. Patient requested cervical exam. 3/70/-2 Patient is requesting to receive an epidural. IV fluid bolus started.

## 2018-12-02 NOTE — ANESTHESIA PROCEDURE NOTES
CSE    Patient location during procedure: OB  Start time: 12/1/2018 8:01 PM  Timeout: 12/1/2018 8:01 PM  End time: 12/1/2018 8:04 PM  Staffing  Anesthesiologist: Mamadou Ley MD  Performed: anesthesiologist   Preanesthetic Checklist  Completed: patient identified, site marked, surgical consent, pre-op evaluation, timeout performed, IV checked, risks and benefits discussed and monitors and equipment checked  CSE  Patient position: sitting  Prep: ChloraPrep and site prepped and draped  Patient monitoring: heart rate, continuous pulse ox and frequent blood pressure checks  Approach: midline  Spinal Needle  Needle type: pencil-tip   Needle gauge: 25 G  Needle length: 5 in  Epidural Needle  Injection technique: YAA air  Needle type: Tuohy   Needle gauge: 17 G  Needle length: 3.5 in  Needle insertion depth: 8 cm  Location: L4-5  Needle localization: anatomical landmarks  Catheter  Catheter type: Celery  Catheter size: 19 G  Catheter at skin depth: 13 cm  Test dose: lidocaine 1.5% with Epi 1-to-200,000  Additional Documentation: negative aspiration for CSF, negative aspiration for heme and negative test dose  Assessment  Intrathecal Medications:  administered: primary anesthetic mcg of

## 2018-12-03 LAB — RPR SER QL: NORMAL

## 2018-12-03 PROCEDURE — 25000003 PHARM REV CODE 250: Performed by: OBSTETRICS & GYNECOLOGY

## 2018-12-03 PROCEDURE — 99231 SBSQ HOSP IP/OBS SF/LOW 25: CPT | Mod: ,,, | Performed by: OBSTETRICS & GYNECOLOGY

## 2018-12-03 PROCEDURE — 11000001 HC ACUTE MED/SURG PRIVATE ROOM

## 2018-12-03 RX ADMIN — OXYCODONE HYDROCHLORIDE AND ACETAMINOPHEN 1 TABLET: 10; 325 TABLET ORAL at 07:12

## 2018-12-03 RX ADMIN — IBUPROFEN 600 MG: 600 TABLET ORAL at 07:12

## 2018-12-03 RX ADMIN — IBUPROFEN 600 MG: 600 TABLET ORAL at 12:12

## 2018-12-03 RX ADMIN — IBUPROFEN 600 MG: 600 TABLET ORAL at 03:12

## 2018-12-03 RX ADMIN — DOCUSATE SODIUM 200 MG: 100 CAPSULE, LIQUID FILLED ORAL at 09:12

## 2018-12-03 RX ADMIN — OXYCODONE HYDROCHLORIDE AND ACETAMINOPHEN 1 TABLET: 10; 325 TABLET ORAL at 12:12

## 2018-12-03 RX ADMIN — IBUPROFEN 600 MG: 600 TABLET ORAL at 09:12

## 2018-12-03 RX ADMIN — OXYCODONE HYDROCHLORIDE AND ACETAMINOPHEN 1 TABLET: 10; 325 TABLET ORAL at 09:12

## 2018-12-03 RX ADMIN — OXYCODONE AND ACETAMINOPHEN 1 TABLET: 5; 325 TABLET ORAL at 03:12

## 2018-12-03 RX ADMIN — DOCUSATE SODIUM 200 MG: 100 CAPSULE, LIQUID FILLED ORAL at 07:12

## 2018-12-03 NOTE — PLAN OF CARE
Problem: Patient Care Overview  Goal: Plan of Care Review  Outcome: Ongoing (interventions implemented as appropriate)  Vss.  ambulaitng and voiding.  Formula feeding infant.  Pain controlled with percocet and motrin.  Plan of care reviewed with pt, all questions and concerns addressed.

## 2018-12-03 NOTE — PROGRESS NOTES
[facility]    Obstetrics & Gynecology  Progress Note      Patient Name:  Ghanshyam Harrison  MRN:  [id number]  Admission Date:  12/1/2018  Hospital Length of Stay:  2  Attending Physician:  Jonel Pollard MD  Primary Care Provider:  Jonel Pollard MD    Subjective:     Date:  12/03/2018    Principal Problem:  Pregnancy with 37 weeks completed gestation    Hospital Course:  Post Partum Day # 1 - s/p vaginal delivery at 37w4d    Patient without major complaints  No acute events overnight  Denies headaches, vision changes, epigastric pain, SOB, CP  Pain well controlled  Lochia less than menses  Bottle/breast feeding   Breast non-tender, non-engorged  Ambulating, tolerating regular diet, and voiding without diffculty  Bonding well with baby    Objective:     Temp:  [96.6 °F (35.9 °C)-98 °F (36.7 °C)] 97.2 °F (36.2 °C)  Pulse:  [94-99] 99  Resp:  [18-19] 19  BP: (106-134)/(53-74) 128/58    BP (!) 128/58 (BP Location: Right arm, Patient Position: Lying)   Pulse 99   Temp 97.2 °F (36.2 °C) (Oral)   Resp 19   Wt 112 kg (247 lb)   LMP 02/27/2018 (Approximate)   SpO2 96%   Breastfeeding? No   BMI 39.87 kg/m²   Clear, teresa urine    Physical Exam:   Constitutional: She appears well-developed. No distress.                             Alert and oriented x 3.   HEENT:  No scleral icterus. Neck supple. Moist mucus membranes.   LUNGS:  Clear to auscultation bilaterally.   HEART:  Regular rate and rhythm with physiologic heart sounds.   ABDOMEN:  Soft, non-tender, non-distended, no guarding, rigidity, or rebound with normoactive bowel sounds.   FUNDUS:  Firm, nontender below the umbilicus.   EXTREMITIES:  No cramping, claudication.  Trace edema LE. +2 distal pulses. Symmetric, full range of motion, negative Hallman.  NEUROLOGIC:  Grossly intact bilaterally. +2 DTR's.   PSYCH:  Mood and affect appropriate.   PERINEUM:  Intact with light lochia.    Labs:      Recent Results (from the past 336 hour(s))    CBC auto differential    Collection Time: 12/02/18  7:44 AM   Result Value Ref Range    WBC 13.97 (H) 3.90 - 12.70 K/uL    Hemoglobin 9.5 (L) 12.0 - 16.0 g/dL    Hematocrit 29.0 (L) 37.0 - 48.5 %    Platelets 352 (H) 150 - 350 K/uL   CBC auto differential    Collection Time: 12/01/18  1:08 PM   Result Value Ref Range    WBC 8.69 3.90 - 12.70 K/uL    Hemoglobin 10.6 (L) 12.0 - 16.0 g/dL    Hematocrit 33.4 (L) 37.0 - 48.5 %    Platelets 395 (H) 150 - 350 K/uL     ABO:  A POS    Assessment:     1. Post-partum day # 1 - IUP at 37w4d s/p spontaneous vaginal delivery - progressing well  2. Anemia, iron deficiency      Plan:     Continue post-partum care  Review post-partum care instructions and precautions  Encourage ambulation  Encourage breast-feeding  Iron supplementation, anemia precautions  Delivery findings, labs/studies, and expectations were discussed with pt.  All questions answered and pt voiced understanding.     Disposition:  As patient meets milestones, will plan to discharge tomorrow.      Jonel Pollard MD

## 2018-12-04 VITALS
HEIGHT: 66 IN | TEMPERATURE: 98 F | SYSTOLIC BLOOD PRESSURE: 124 MMHG | RESPIRATION RATE: 20 BRPM | HEART RATE: 103 BPM | OXYGEN SATURATION: 96 % | WEIGHT: 246.94 LBS | BODY MASS INDEX: 39.69 KG/M2 | DIASTOLIC BLOOD PRESSURE: 72 MMHG

## 2018-12-04 PROCEDURE — 99238 HOSP IP/OBS DSCHRG MGMT 30/<: CPT | Mod: ,,, | Performed by: OBSTETRICS & GYNECOLOGY

## 2018-12-04 PROCEDURE — 25000003 PHARM REV CODE 250: Performed by: OBSTETRICS & GYNECOLOGY

## 2018-12-04 RX ORDER — IBUPROFEN 600 MG/1
600 TABLET ORAL EVERY 6 HOURS PRN
Qty: 60 TABLET | Refills: 0 | Status: SHIPPED | OUTPATIENT
Start: 2018-12-04 | End: 2019-08-05

## 2018-12-04 RX ADMIN — OXYCODONE HYDROCHLORIDE AND ACETAMINOPHEN 1 TABLET: 10; 325 TABLET ORAL at 03:12

## 2018-12-04 RX ADMIN — IBUPROFEN 600 MG: 600 TABLET ORAL at 06:12

## 2018-12-04 RX ADMIN — OXYCODONE HYDROCHLORIDE AND ACETAMINOPHEN 1 TABLET: 10; 325 TABLET ORAL at 12:12

## 2018-12-04 RX ADMIN — IBUPROFEN 600 MG: 600 TABLET ORAL at 01:12

## 2018-12-04 RX ADMIN — OXYCODONE HYDROCHLORIDE AND ACETAMINOPHEN 1 TABLET: 10; 325 TABLET ORAL at 08:12

## 2018-12-04 NOTE — PLAN OF CARE
Problem: Patient Care Overview  Goal: Plan of Care Review  Outcome: Ongoing (interventions implemented as appropriate)  VSS, Formula feeding per request. Supportive bra encouraged. Fundus and lochia WDL. Voiding, passing flatus, ambulating and tolerating regular diet. Bonding well with baby. Pain being managed with motrin and percocet as needed. Stated an understanding to POC.

## 2018-12-04 NOTE — PLAN OF CARE
Problem: Patient Care Overview  Goal: Plan of Care Review  Outcome: Outcome(s) achieved Date Met: 12/04/18  Patient in stable condition. Verbalizes understanding of care plan with good recall.

## 2018-12-04 NOTE — DISCHARGE SUMMARY
[facility]    Discharge Summary  Obstetrics & Gynecology      Patient Name:  Ghanshyam Harrison  MRN:  [id number]  Admission Date:  2018  Discharge Date:  2018  Hospital Length of Stay:  3  Attending Physician:  Jonel Pollard MD  Discharging Physician:  Jonel Pollard MD  Primary Care Provider:  Jonel Pollard MD  Date of Delivery:  2018    Admitting Diagnosis:       Sandoval gestation at 37w4d  SROM  Active labor  Anemia, iron deficiency      Discharge Diagnosis:    Sandoval gestation at term s/p spontaneous vaginal delivery   Anemia, iron deficiency      Procedure performed:      Vaginal delivery    Brief Hospital Course:      See History & Physical for complete details.  In brief, Ghanshyam Harrison is a 33 y.o.  at 37w4d gestation presented to L&D with SROM and early labor.  Pt was admitted to L&D for active management of labor.  Pt progressed well through the active phase of labor and delivered a viable infant.  Maternal and fetal status was overall reassuring throughout the labor course.  See delivery note for further details.  Following delivery, pt was transferred to mother baby unit for routine post-partum care.  Pt had a fairly uncomplicated postpartum course.  Prior to discharge, she was without major complaints.  Pt pain was well controlled.  Pt was ambulating, tolerating a regular diet, voiding without difficulty, and bonding well with baby.  Lochia was light.  Vital signs where physiologic and stable.  Physical exam showed no acute findings.  Pt had satisfied routine postpartum discharge criteria and expressed the desire to be discharge from the hospital.     Pertinent Labs or Studies:      Recent Results (from the past 336 hour(s))   CBC auto differential    Collection Time: 18  7:44 AM   Result Value Ref Range    WBC 13.97 (H) 3.90 - 12.70 K/uL    Hemoglobin 9.5 (L) 12.0 - 16.0 g/dL    Hematocrit 29.0 (L) 37.0 - 48.5 %    Platelets 352 (H) 150  "- 350 K/uL   CBC auto differential    Collection Time: 12/01/18  1:08 PM   Result Value Ref Range    WBC 8.69 3.90 - 12.70 K/uL    Hemoglobin 10.6 (L) 12.0 - 16.0 g/dL    Hematocrit 33.4 (L) 37.0 - 48.5 %    Platelets 395 (H) 150 - 350 K/uL     ABO:  A POS    Discharge Exam:      Temp:  [97.3 °F (36.3 °C)-98.4 °F (36.9 °C)] 97.6 °F (36.4 °C)  Pulse:  [] 103  Resp:  [17-20] 20  BP: (124-140)/(72-83) 124/72    /72 (BP Location: Left arm, Patient Position: Lying)   Pulse 103   Temp 97.6 °F (36.4 °C) (Oral)   Resp 20   Ht 5' 6" (1.676 m)   Wt 112 kg (246 lb 14.6 oz)   LMP 02/27/2018 (Approximate)   SpO2 96%   Breastfeeding? No   BMI 39.85 kg/m²     GENERAL:  No acute distress. Alert and oriented x 3.   HEENT:  Normocephalic. EOMI. No scleral icterus. Neck supple. Moist mucus membranes.   LUNGS:  Clear to auscultation bilaterally.   HEART:  Regular rate and rhythm with physiologic heart sounds.   ABDOMEN:  Soft, non-tender, non-distended, no guarding, rigidity, or rebound.   FUNDUS:  Firm, nontender below the umbilicus.   EXTREMITIES:  No cramping, claudication.  Trace edema. Good skin turgor. +2 distal pulses, symmetric. Full range of motion.   NEUROLOGIC:  Grossly intact bilaterally.   PSYCH:  Mood and affect appropriate.   PERINEUM:  Intact with light lochia.    Discharge Condition:  Stable      Discharge Disposition:  Home       Discharge Medications:     Resume prenatal vitamins 1 tab po qday  Resume fergon 325 mg 1 tab po bid  Resume colace 100 mg 1 tab po bid prn constipation  Motrin 600 mg 1 tab po q6h prn pain, disp #60, refill 0    Discharge Activites:      Resume activities as tolerated with adequate rest  Pelvic rest for 6 weeks   No heavy lifting greater 10 lbs or strenuous activities   Showers only  Wound care instructions and precautions given   Do NOT driving and operating machinery while taking narcotics or other sedative medications, pt voiced understanding.    Discharge Diet:  " Regular diet    Discharge Instructions:      Pt was instructed to contact the clinic or emergency department for any concerns including but not limited to an increase in her lochia, abdominal pain, foul vaginal discharge, weakness, decrease activity level, decrease appetite, feeling sad or hopeless, inability to care for her baby, breast pain or infection, difficulty with voiding or having bowel movements, perineal pain or breakdown, wound breakdown, fever >100.4 or abnormal vital signs, shortness of breath, chest pain, dizziness or feeling faint, pain or swelling in her extremities, headaches not relieved with rest and Tylenol, vision changes, seizure activities, abnormal bleeding/bruising, or any other health concerns.  Post-partum care instructions and precautions, labs/studies, clinical/delivery findings, and hospital course reviewed with the patient prior to discharge.  All of her questions were answered to her satisfaction.  Pt voiced understanding.  Keep blood pressure log at home and call office if BP > 140/90.  Hypertensive, pre-eclampsia given.     Follow-up Visit:  6 weeks for postpartum visit, or sooner if any problems.       Jonel Pollard MD

## 2018-12-04 NOTE — PLAN OF CARE
Problem: Patient Care Overview  Goal: Plan of Care Review  Outcome: Ongoing (interventions implemented as appropriate)  POC discussed. Encouraged fluids and ambulation in the diaz. Reviewed pain medication. Denies any needs. ANSHUL.

## 2018-12-04 NOTE — PROGRESS NOTES
"[facility]    Obstetrics & Gynecology  Progress Note      Patient Name:  Ghanshyam Harrison  MRN:  [id number]  Admission Date:  12/1/2018  Hospital Length of Stay:  3  Attending Physician:  Jonel Pollard MD  Primary Care Provider:  Jonel Pollard MD    Subjective:     Date:  12/04/2018    Principal Problem:  Pregnancy with 37 weeks completed gestation    Hospital Course:  Post Partum Day # 2 - s/p vaginal delivery at 37w4d    Patient without major complaints  No acute events overnight  Denies headaches, vision changes, epigastric pain, SOB, CP  Requesting to go home  Pain well controlled  Lochia less than menses  Bottle/breast feeding   Breast non-tender, non-engorged  Ambulating, tolerating regular diet, and voiding without diffculty  Bonding well with baby    Objective:     Temp:  [97.2 °F (36.2 °C)-98.4 °F (36.9 °C)] 97.6 °F (36.4 °C)  Pulse:  [] 103  Resp:  [14-20] 20  BP: (124-140)/(58-83) 124/72    /72 (BP Location: Left arm, Patient Position: Lying)   Pulse 103   Temp 97.6 °F (36.4 °C) (Oral)   Resp 20   Ht 5' 6" (1.676 m)   Wt 112 kg (246 lb 14.6 oz)   LMP 02/27/2018 (Approximate)   SpO2 96%   Breastfeeding? No   BMI 39.85 kg/m²   Clear, teresa urine    Physical Exam:   Constitutional: She appears well-developed. No distress.                             Alert and oriented x 3.   HEENT:  No scleral icterus. Neck supple. Moist mucus membranes.   LUNGS:  Clear to auscultation bilaterally.   HEART:  Regular rate and rhythm with physiologic heart sounds.   ABDOMEN:  Soft, non-tender, non-distended, no guarding, rigidity, or rebound with normoactive bowel sounds.   FUNDUS:  Firm, nontender below the umbilicus.   EXTREMITIES:  No cramping, claudication.  Trace edema LE. +2 distal pulses. Symmetric, full range of motion, negative Hallman.  NEUROLOGIC:  Grossly intact bilaterally. +2 DTR's.   PSYCH:  Mood and affect appropriate.   PERINEUM:  Intact with light " saeid.    Assessment:     1. Post-partum day # 2 - IUP at 37w4d s/p spontaneous vaginal delivery - progressing well  2. Anemia, iron deficiency      Plan:     Plan for discharge today.     Iron supplementation, anemia precautions    Reviewed discharge medications.  Pt was instructed to avoid driving or operate heavy machinery while taking narcotics or other medications with sedative properties.    Start home BP monitoring TID, call office if BP > 140/90.  Low salt diet.  Encourage healthy lifestyle modifications.  HTN/preE precautions.    Post-partum care instructions and precautions, clinical/delivery findings, and hospital course reviewed with pt prior to discharge.  All of her questions were answered to her satisfaction.  Pt voiced understanding and agrees with discharge.    Return to clinic in 6 week for post-partum visit or sooner if any concerns/elevated BP.        Jonel Pollard MD

## 2018-12-04 NOTE — PLAN OF CARE
Problem: Patient Care Overview  Goal: Plan of Care Review  Outcome: Ongoing (interventions implemented as appropriate)  Tolerating regular diet.  Voiding and passing flatus.  Verbalizes pain control with prn pain medications.  Ambulating in room.  Verbalizes knowledge of plan of care.

## 2018-12-12 ENCOUNTER — PATIENT MESSAGE (OUTPATIENT)
Dept: OBSTETRICS AND GYNECOLOGY | Facility: CLINIC | Age: 33
End: 2018-12-12

## 2018-12-12 DIAGNOSIS — B37.31 VAGINAL YEAST INFECTION: Primary | ICD-10-CM

## 2018-12-13 RX ORDER — FLUCONAZOLE 150 MG/1
150 TABLET ORAL
Qty: 6 TABLET | Refills: 0 | Status: SHIPPED | OUTPATIENT
Start: 2018-12-13 | End: 2019-08-05

## 2019-08-05 ENCOUNTER — HOSPITAL ENCOUNTER (EMERGENCY)
Facility: HOSPITAL | Age: 34
Discharge: HOME | End: 2019-08-05
Attending: EMERGENCY MEDICINE
Payer: COMMERCIAL

## 2019-08-05 VITALS
HEIGHT: 66 IN | RESPIRATION RATE: 18 BRPM | DIASTOLIC BLOOD PRESSURE: 89 MMHG | OXYGEN SATURATION: 96 % | BODY MASS INDEX: 40.18 KG/M2 | SYSTOLIC BLOOD PRESSURE: 131 MMHG | HEART RATE: 85 BPM | TEMPERATURE: 98 F | WEIGHT: 250 LBS

## 2019-08-05 DIAGNOSIS — M25.571 ACUTE RIGHT ANKLE PAIN: ICD-10-CM

## 2019-08-05 DIAGNOSIS — M25.561 ACUTE PAIN OF RIGHT KNEE: Primary | ICD-10-CM

## 2019-08-05 LAB
B-HCG UR QL: NEGATIVE
CTP QC/QA: YES

## 2019-08-05 PROCEDURE — 81025 URINE PREGNANCY TEST: CPT | Performed by: NURSE PRACTITIONER

## 2019-08-05 PROCEDURE — 99283 EMERGENCY DEPT VISIT LOW MDM: CPT

## 2019-08-05 RX ORDER — IBUPROFEN 600 MG/1
600 TABLET ORAL EVERY 6 HOURS PRN
Qty: 20 TABLET | Refills: 0 | Status: SHIPPED | OUTPATIENT
Start: 2019-08-05 | End: 2023-01-25

## 2019-08-05 NOTE — ED PROVIDER NOTES
Encounter Date: 2019       History     Chief Complaint   Patient presents with    Joint Pain     pt c/o right knee and ankle pain since friday     CC: Joint pain    HPI: 34 y.o. Female presents for consideration of joint pain. Patient reports right knee and right ankle pain since standing at her uncle's  for a prolonged period of time last Friday. She denies trauma, fall, lower extremity swelling, numbness, tingling, calf pain, SOB or further symptoms. She attempted ibuprofen yesterday with relief of pain.         Review of patient's allergies indicates:   Allergen Reactions    Ace inhibitors Anaphylaxis    Amoxicillin Anaphylaxis    Bactrim [sulfamethoxazole-trimethoprim] Anaphylaxis    Lisinopril Anaphylaxis    Penicillins Anaphylaxis     Past Medical History:   Diagnosis Date         Obesity      Past Surgical History:   Procedure Laterality Date    CHOLECYSTECTOMY      D & C (SUCTION) N/A 3/24/2015    Performed by [facility] at [facility]    DILATION AND CURETTAGE OF UTERUS      GALLBLADDER SURGERY       Family History   Problem Relation Age of Onset    Breast cancer Neg Hx     Colon cancer Neg Hx     Ovarian cancer Neg Hx      Social History     Tobacco Use    Smoking status: Never Smoker    Smokeless tobacco: Never Used   Substance Use Topics    Alcohol use: No    Drug use: No     Review of Systems   Constitutional: Negative for chills and fever.   HENT: Negative for congestion, ear discharge, ear pain and sore throat.    Eyes: Negative for pain.   Respiratory: Negative for cough and shortness of breath.    Cardiovascular: Negative for chest pain, palpitations and leg swelling.   Gastrointestinal: Negative for abdominal pain, constipation, diarrhea, nausea and vomiting.   Genitourinary: Negative for decreased urine volume, dysuria, vaginal bleeding, vaginal discharge and vaginal pain.   Musculoskeletal: Positive for arthralgias. Negative for back pain, gait  problem, joint swelling, myalgias, neck pain and neck stiffness.   Skin: Negative for rash.   Neurological: Negative for weakness and headaches.   Hematological: Does not bruise/bleed easily.   Psychiatric/Behavioral: Negative for confusion.       Physical Exam     Initial Vitals [08/05/19 1527]   BP Pulse Resp Temp SpO2   (!) 142/75 87 18 98 °F (36.7 °C) 99 %      MAP       --         Physical Exam    Nursing note and vitals reviewed.  Constitutional: Vital signs are normal. She appears well-developed and well-nourished. She is not diaphoretic. She is cooperative.  Non-toxic appearance. She does not have a sickly appearance. She does not appear ill. No distress.   HENT:   Head: Normocephalic and atraumatic.   Right Ear: External ear normal.   Left Ear: External ear normal.   Nose: Nose normal.   Mouth/Throat: Uvula is midline, oropharynx is clear and moist and mucous membranes are normal. No oropharyngeal exudate.   Eyes: Conjunctivae, EOM and lids are normal. Pupils are equal, round, and reactive to light.   Neck: Trachea normal, normal range of motion, full passive range of motion without pain and phonation normal. Neck supple.   Cardiovascular: Normal rate, regular rhythm, normal heart sounds and intact distal pulses. Exam reveals no gallop and no friction rub.    No murmur heard.  Pulses:       Dorsalis pedis pulses are 2+ on the right side, and 2+ on the left side.   Capillary refill < 2 seconds in bilateral lower extremities.    Pulmonary/Chest: Effort normal and breath sounds normal. No respiratory distress. She has no decreased breath sounds. She has no wheezes. She has no rhonchi. She has no rales.   Abdominal: Soft. Normal appearance and bowel sounds are normal. She exhibits no distension and no mass. There is no tenderness. There is no rigidity, no rebound and no guarding.   Musculoskeletal:        Right knee: She exhibits normal range of motion, no swelling, no effusion, no ecchymosis, no deformity, no  laceration, no erythema, normal alignment, no LCL laxity, normal patellar mobility, no bony tenderness, normal meniscus and no MCL laxity. Tenderness found.        Left knee: Normal.        Right ankle: She exhibits normal range of motion, no swelling, no ecchymosis, no deformity, no laceration and normal pulse. Tenderness.        Left ankle: Normal.        Right upper leg: Normal.        Left upper leg: Normal.        Right lower leg: Normal. She exhibits no tenderness, no bony tenderness, no swelling, no edema, no deformity and no laceration.        Left lower leg: Normal.        Legs:       Right foot: Normal.        Left foot: Normal.   There is TTP of the lateral right knee and generalized right ankle without edema, swelling or obvious deformity. Full ROM of the bilateral lower extremities. Peripheral pulses intact. Peripheral strength and sensation intact. No TTP of the calf/negative Ricki's sign.    Neurological: She is alert and oriented to person, place, and time. She has normal strength. No cranial nerve deficit or sensory deficit. She exhibits normal muscle tone. Coordination and gait normal. GCS eye subscore is 4. GCS verbal subscore is 5. GCS motor subscore is 6.   Skin: Skin is warm and dry. Capillary refill takes less than 2 seconds. No rash noted.   Psychiatric: She has a normal mood and affect. Her speech is normal and behavior is normal. Judgment and thought content normal. Cognition and memory are normal.         ED Course   Procedures  Labs Reviewed   POCT URINE PREGNANCY          Imaging Results    None                APC / Resident Notes:   34 y.o. Female presents for consideration of joint pain. Patient reports right knee and right ankle pain since standing at her uncle's  for a prolonged period of time last Friday. She denies trauma, fall, lower extremity swelling, numbness, tingling, calf pain, SOB or further symptoms. Ibuprofen helped her pain and she states her pain is mild  currently.    Patient is non-toxic, afebrile and well appearing. Exam findings consistent with right knee pain and right ankle pain, suspect secondary to prolonged standing. No injury or trauma to warrant imaging. No evidence of septic joint.     If available, past records have been reviewed.  Vitals are reassuring.    ED course: Patient states that her pain is mild currently and declined any pain medications in the ED. I will recommend NSAIDs for pain and RICE therapy; I will recommend compression stockings. I will recommend follow-up with PCP.  ED return precautions given.    The diagnosis and treatment plan have been discussed with the patient. All questions and concerns have been addressed. Patient expressed understanding. An educational information sheet was given to the patient prior to discharge.     Deanne Nunez PA-C                   Clinical Impression:       ICD-10-CM ICD-9-CM   1. Acute pain of right knee M25.561 719.46   2. Acute right ankle pain M25.571 719.47     338.19         Disposition:   Disposition: Discharged  Condition: Stable                        Deanne Nunez PA-C  08/05/19 2210

## 2019-08-05 NOTE — DISCHARGE INSTRUCTIONS
You were prescribed Motrin to take for pain control     Rest, apply ice or heating pads, wear compression stockings and elevate the right knee/ankle.    Follow-up with primary care and orthopedics if symptoms persist.    Return to the ER for any concerns.

## 2019-11-11 PROBLEM — Z3A.37 37 WEEKS GESTATION OF PREGNANCY: Status: RESOLVED | Noted: 2018-12-02 | Resolved: 2019-11-11

## 2019-11-11 PROBLEM — Z3A.37 PREGNANCY WITH 37 WEEKS COMPLETED GESTATION: Status: RESOLVED | Noted: 2018-12-01 | Resolved: 2019-11-11

## 2019-11-11 PROBLEM — O09.299 HISTORY OF MISCARRIAGE, CURRENTLY PREGNANT: Status: RESOLVED | Noted: 2018-12-01 | Resolved: 2019-11-11

## 2020-11-16 ENCOUNTER — HOSPITAL ENCOUNTER (EMERGENCY)
Facility: HOSPITAL | Age: 35
Discharge: HOME | End: 2020-11-16
Attending: EMERGENCY MEDICINE
Payer: MEDICAID

## 2020-11-16 VITALS
HEART RATE: 70 BPM | BODY MASS INDEX: 37.93 KG/M2 | HEIGHT: 66 IN | SYSTOLIC BLOOD PRESSURE: 121 MMHG | TEMPERATURE: 99 F | WEIGHT: 236 LBS | OXYGEN SATURATION: 98 % | DIASTOLIC BLOOD PRESSURE: 57 MMHG | RESPIRATION RATE: 17 BRPM

## 2020-11-16 DIAGNOSIS — J06.9 VIRAL URI WITH COUGH: Primary | ICD-10-CM

## 2020-11-16 LAB
B-HCG UR QL: NEGATIVE
CTP QC/QA: YES
POC MOLECULAR INFLUENZA A AGN: NEGATIVE
POC MOLECULAR INFLUENZA B AGN: NEGATIVE
SARS-COV-2 RDRP RESP QL NAA+PROBE: NEGATIVE

## 2020-11-16 PROCEDURE — U0002 COVID-19 LAB TEST NON-CDC: HCPCS | Performed by: PHYSICIAN ASSISTANT

## 2020-11-16 PROCEDURE — 99283 EMERGENCY DEPT VISIT LOW MDM: CPT

## 2020-11-16 PROCEDURE — 87502 INFLUENZA DNA AMP PROBE: CPT

## 2020-11-16 RX ORDER — CETIRIZINE HYDROCHLORIDE 10 MG/1
10 TABLET ORAL DAILY
Qty: 30 TABLET | Refills: 0 | Status: SHIPPED | OUTPATIENT
Start: 2020-11-16 | End: 2023-01-25

## 2020-11-16 RX ORDER — FLUTICASONE PROPIONATE 50 MCG
1 SPRAY, SUSPENSION (ML) NASAL 2 TIMES DAILY PRN
Qty: 15 G | Refills: 0 | Status: SHIPPED | OUTPATIENT
Start: 2020-11-16 | End: 2023-01-25

## 2020-11-16 RX ORDER — BENZONATATE 100 MG/1
100 CAPSULE ORAL 3 TIMES DAILY PRN
Qty: 20 CAPSULE | Refills: 0 | Status: SHIPPED | OUTPATIENT
Start: 2020-11-16 | End: 2020-11-26

## 2020-11-16 NOTE — Clinical Note
"Ghanshyam"Patricia Harrison was seen and treated in our emergency department on 11/16/2020.     COVID-19 is present in our communities across the state. There is limited testing for COVID at this time, so not all patients can be tested. In this situation, your employee meets the following criteria:    Ghanshyam Harrison has met the criteria for COVID-19 testing and has a NEGATIVE result. The employee can return to work once they are asymptomatic for 72 hours without the use of fever reducing medications (Tylenol, Motrin, etc).     If you have any questions or concerns, or if I can be of further assistance, please do not hesitate to contact me.    Sincerely,             Emigdio Campos PA-C"

## 2020-11-17 NOTE — ED TRIAGE NOTES
Pt arrives to the ED reports tjhat she has been coughing and productive green mucous since the weekend. Pt also says that she thinks she got it from her daughter. Denies fever, chills or headache.

## 2020-11-17 NOTE — ED PROVIDER NOTES
Encounter Date: 2020    SCRIBE #1 NOTE: I, Malik Linda, am scribing for, and in the presence of,  Emigdio Campos PA-C. I have scribed the following portions of the note - Other sections scribed: HPI, ROS, PE.       History     Chief Complaint   Patient presents with    Cough     cough since yesterday.  + production, yellow/green in color.  denies n/v/d or fever.     CC: Cough    HPI: This is a 36 y/o female with no pertinent PMHx presenting to the ED c/o productive coughs with sputum since yesterday.  Pt reports she has been in contact with her sick infant daughter at home who has been having coughs and congestion for the past 3 days.  She denies any sore throat.  No known h/o HTN.  She is not on daily meds.  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    The history is provided by the patient and medical records. No  was used.     Review of patient's allergies indicates:   Allergen Reactions    Ace inhibitors Anaphylaxis    Amoxicillin Anaphylaxis    Bactrim [sulfamethoxazole-trimethoprim] Anaphylaxis    Lisinopril Anaphylaxis    Penicillins Anaphylaxis     Past Medical History:   Diagnosis Date         Obesity      Past Surgical History:   Procedure Laterality Date    CHOLECYSTECTOMY      DILATION AND CURETTAGE OF UTERUS      GALLBLADDER SURGERY       Family History   Problem Relation Age of Onset    Breast cancer Neg Hx     Colon cancer Neg Hx     Ovarian cancer Neg Hx      Social History     Tobacco Use    Smoking status: Never Smoker    Smokeless tobacco: Never Used   Substance Use Topics    Alcohol use: No    Drug use: No     Review of Systems   Constitutional: Negative for fever.   HENT: Negative for sore throat.    Eyes: Negative for visual disturbance.   Respiratory: Positive for cough. Negative for shortness of breath.    Cardiovascular: Negative for chest pain.   Gastrointestinal: Negative for abdominal pain.   Genitourinary: Negative for dysuria.    Musculoskeletal: Negative for back pain.   Skin: Negative for rash.   Neurological: Negative for headaches.       Physical Exam     Initial Vitals [11/16/20 1723]   BP Pulse Resp Temp SpO2   115/85 71 18 98.8 °F (37.1 °C) 97 %      MAP       --         Physical Exam    Nursing note and vitals reviewed.  Constitutional: She appears well-developed and well-nourished. No distress.   HENT:   Head: Normocephalic and atraumatic.   Nose: Nose normal.   Mouth/Throat: Oropharynx is clear and moist.   Eyes: Conjunctivae and EOM are normal. Pupils are equal, round, and reactive to light.   Neck: Normal range of motion. Neck supple.   Cardiovascular: Normal rate, regular rhythm, normal heart sounds and intact distal pulses.   Pulmonary/Chest: Breath sounds normal. No respiratory distress.   Abdominal: Soft. She exhibits no distension. There is no abdominal tenderness.   Musculoskeletal: Normal range of motion. No tenderness or edema.   Neurological: She is alert and oriented to person, place, and time. No cranial nerve deficit.   Skin: Skin is warm and dry. Capillary refill takes less than 2 seconds.   Psychiatric: She has a normal mood and affect.         ED Course   Procedures  Labs Reviewed   SARS-COV-2 RDRP GENE   POCT INFLUENZA A/B MOLECULAR          Imaging Results    None          Medical Decision Making:   Clinical Tests:   Lab Tests: Ordered and Reviewed  ED Management:  Hemodynamically stable.  Nontoxic in no acute distress.  Patient is overall well-appearing, pleasant, conversational.  COVID and flu negative.  History and physical exam findings consistent with viral URI.  Will discharge home with prescriptions for symptomatic relief and PCP follow-up.  Strict ED return precautions given for any worsening or additional concerning symptoms.  Patient verbalizes understanding and is agreeable with plan.            Scribe Attestation:   Scribe #1: I performed the above scribed service and the documentation accurately  describes the services I performed. I attest to the accuracy of the note.                      Clinical Impression:     ICD-10-CM ICD-9-CM   1. Viral URI with cough  J06.9 465.9                    I, SHAHBAZ FONTAINE, personally performed the services described in this documentation. All medical record entries made by the scribe were at my direction and in my presence.  I have reviewed the chart and agree that the record reflects my personal performance and is accurate and complete.  Disposition:   Disposition: Discharged  Condition: Stable     ED Disposition Condition    Discharge Stable        ED Prescriptions     Medication Sig Dispense Start Date End Date Auth. Provider    cetirizine (ZYRTEC) 10 MG tablet Take 1 tablet (10 mg total) by mouth once daily. 30 tablet 11/16/2020 11/16/2021 Shahbaz Fontaine PA-C    benzonatate (TESSALON) 100 MG capsule Take 1 capsule (100 mg total) by mouth 3 (three) times daily as needed. 20 capsule 11/16/2020 11/26/2020 Shahbaz Fontaine PA-C    fluticasone propionate (FLONASE) 50 mcg/actuation nasal spray 1 spray (50 mcg total) by Each Nostril route 2 (two) times daily as needed. 15 g 11/16/2020  Shahbaz Fontaine PA-C        Follow-up Information     Follow up With Specialties Details Why Contact Info    Jonel Pollard MD Obstetrics and Gynecology Schedule an appointment as soon as possible for a visit   [address]  [address]  [facility] [address]  [phone]                                         Shahbaz Fontaine PA-C  11/16/20 2029

## 2021-04-15 ENCOUNTER — PATIENT MESSAGE (OUTPATIENT)
Dept: RESEARCH | Facility: HOSPITAL | Age: 36
End: 2021-04-15

## 2022-09-22 ENCOUNTER — OFFICE VISIT (OUTPATIENT)
Dept: OBSTETRICS AND GYNECOLOGY | Facility: CLINIC | Age: 37
End: 2022-09-22
Payer: MEDICAID

## 2022-09-22 VITALS
HEIGHT: 66 IN | SYSTOLIC BLOOD PRESSURE: 104 MMHG | BODY MASS INDEX: 37.74 KG/M2 | DIASTOLIC BLOOD PRESSURE: 78 MMHG | WEIGHT: 234.81 LBS

## 2022-09-22 DIAGNOSIS — Z01.419 WELL WOMAN EXAM WITH ROUTINE GYNECOLOGICAL EXAM: Primary | ICD-10-CM

## 2022-09-22 DIAGNOSIS — Z12.4 CERVICAL CANCER SCREENING: ICD-10-CM

## 2022-09-22 PROBLEM — Z34.90 PREGNANCY WITH ONE FETUS: Status: RESOLVED | Noted: 2018-07-20 | Resolved: 2022-09-22

## 2022-09-22 PROCEDURE — 99385 PR PREVENTIVE VISIT,NEW,18-39: ICD-10-PCS | Mod: S$PBB,,, | Performed by: OBSTETRICS & GYNECOLOGY

## 2022-09-22 PROCEDURE — 88175 CYTOPATH C/V AUTO FLUID REDO: CPT | Performed by: PATHOLOGY

## 2022-09-22 PROCEDURE — 3078F PR MOST RECENT DIASTOLIC BLOOD PRESSURE < 80 MM HG: ICD-10-PCS | Mod: CPTII,,, | Performed by: OBSTETRICS & GYNECOLOGY

## 2022-09-22 PROCEDURE — 88141 CYTOPATH C/V INTERPRET: CPT | Mod: ,,, | Performed by: PATHOLOGY

## 2022-09-22 PROCEDURE — 1160F PR REVIEW ALL MEDS BY PRESCRIBER/CLIN PHARMACIST DOCUMENTED: ICD-10-PCS | Mod: CPTII,,, | Performed by: OBSTETRICS & GYNECOLOGY

## 2022-09-22 PROCEDURE — 87624 HPV HI-RISK TYP POOLED RSLT: CPT | Performed by: OBSTETRICS & GYNECOLOGY

## 2022-09-22 PROCEDURE — 3074F SYST BP LT 130 MM HG: CPT | Mod: CPTII,,, | Performed by: OBSTETRICS & GYNECOLOGY

## 2022-09-22 PROCEDURE — 3008F PR BODY MASS INDEX (BMI) DOCUMENTED: ICD-10-PCS | Mod: CPTII,,, | Performed by: OBSTETRICS & GYNECOLOGY

## 2022-09-22 PROCEDURE — 1159F MED LIST DOCD IN RCRD: CPT | Mod: CPTII,,, | Performed by: OBSTETRICS & GYNECOLOGY

## 2022-09-22 PROCEDURE — 99999 PR PBB SHADOW E&M-EST. PATIENT-LVL III: CPT | Mod: PBBFAC,,, | Performed by: OBSTETRICS & GYNECOLOGY

## 2022-09-22 PROCEDURE — 99213 OFFICE O/P EST LOW 20 MIN: CPT | Mod: PBBFAC | Performed by: OBSTETRICS & GYNECOLOGY

## 2022-09-22 PROCEDURE — 99999 PR PBB SHADOW E&M-EST. PATIENT-LVL III: ICD-10-PCS | Mod: PBBFAC,,, | Performed by: OBSTETRICS & GYNECOLOGY

## 2022-09-22 PROCEDURE — 1159F PR MEDICATION LIST DOCUMENTED IN MEDICAL RECORD: ICD-10-PCS | Mod: CPTII,,, | Performed by: OBSTETRICS & GYNECOLOGY

## 2022-09-22 PROCEDURE — 3078F DIAST BP <80 MM HG: CPT | Mod: CPTII,,, | Performed by: OBSTETRICS & GYNECOLOGY

## 2022-09-22 PROCEDURE — 3074F PR MOST RECENT SYSTOLIC BLOOD PRESSURE < 130 MM HG: ICD-10-PCS | Mod: CPTII,,, | Performed by: OBSTETRICS & GYNECOLOGY

## 2022-09-22 PROCEDURE — 99385 PREV VISIT NEW AGE 18-39: CPT | Mod: S$PBB,,, | Performed by: OBSTETRICS & GYNECOLOGY

## 2022-09-22 PROCEDURE — 1160F RVW MEDS BY RX/DR IN RCRD: CPT | Mod: CPTII,,, | Performed by: OBSTETRICS & GYNECOLOGY

## 2022-09-22 PROCEDURE — 88141 PR  CYTOPATH CERV/VAG INTERPRET: ICD-10-PCS | Mod: ,,, | Performed by: PATHOLOGY

## 2022-09-22 PROCEDURE — 3008F BODY MASS INDEX DOCD: CPT | Mod: CPTII,,, | Performed by: OBSTETRICS & GYNECOLOGY

## 2022-09-22 NOTE — PROGRESS NOTES
"[facility]  Ambulatory Clinic  Obstetrics & Gynecology    Visit Date:  2022    Chief Complaint:  Annual GYN exam    History of Present Illness:      Ghanshyam Harrison is a 37 y.o.  here for a gynecologic exam.    Pt has no major complaints today.      Menses are regular, not heavy or painful.    Pt current method of family planning is natural family planning and reports no problems with this method.      Last pap ~2017 benign.    Pt denies active sexually transmitted infections.    Pt performs monthly self breast examination, non-smoker, uses seat belts, and denies abuse.     Pt denies abnormal vaginal bleeding, vaginal discharge, dysmenorrhea, dyspareunia, pelvic pain, bloating, early satiety, unintentional weight loss, breast mass/skin changes, incontinence, GI or urinary complaints.      Otherwise, the pt is in her usual state of health.    Past History:  Gynecologic history as noted above.    Review of Systems:      GENERAL:  No fever, fatigue, excessive weight gain or loss  HEENT:  No headaches, hearing changes, visual disturbance  RESPIRATORY:  No cough, shortness of breath  CARDIOVASCULAR:  No chest pain, heart palpitations, leg swelling  BREAST:  No lump, pain, nipple discharge, skin changes  GASTROINTESTINAL:  No nausea, vomiting, constipation, diarrhea, abd pain, rectal bleeding   GENITOURINARY:  See HPI  ENDOCRINE:  No heat or cold intolerance  HEMATOLOGIC:  No easy bruisability or bleeding   LYMPHATICS:  No enlarged nodes  MUSCULOSKELETAL:  No acute joint pain or swelling  SKIN:  No rash, lesions, jaundice  NEUROLOGIC:  No dizziness, weakness, syncope  PSYCHIATRIC:  No significant mood changes, homicidal/suicidal ideations    Physical Exam:     /78   Ht 5' 6" (1.676 m)   Wt 106.5 kg (234 lb 12.6 oz)   LMP 2022   BMI 37.90 kg/m²   Pulse 60's, Resp rate 18     GENERAL:  No acute distress, well-nourished  HEENT:  Atraumatic, anicteric, moist mucus " membranes. Neck supple w/o masses.  BREAST:  Symmetric, nontender, no obvious masses, adenopathy, skin changes or nipple discharge.  LUNGS:  Clear normal respiratory effort  HEART:  Regular rate and rhythm, no murmurs, gallops, or rubs  ABDOMEN:  Soft, non-tender, non-distended, normoactive bowel sounds, no obvious organomegaly  EXT:  Symmetric w/o cramping, claudication, or edema. +2 distal pulses.  SKIN:  No rashes or bruising  PSYCH:  Mood and affect appropriate  NEURO:  Grossly intact bilaterally    GENITOURINARY:    VULVAR:  Female external genitalia w/o obvious lesions. Female hair distribution. Normal urethral meatus. No gross lymphadenopathy.    VAGINA:  Well estrogenized with good rugation. Normal lubrication. Good support. No obvious lesion. No discharge.  CERVIX:  No cervical motion tenderness, discharge, or obvious lesions.   UTERUS:  Small, non-tender, normal contour  ADNEXA:  No masses, non-tender    RECTAL:  Deferred. No obvious external lesions    Chaperone present for exam.    Assessment:     37 y.o. :    Well woman gynecologic exam    Plan:    A gynecologic health assessment was performed with age appropriate counseling.    Cervical cancer screening - pap obtained.    STI screening - pt requested declined.    Encourage healthy lifestyle modifications, monthly self breast exams, Ca/Vit D, COVID vaccines.    F/u with PCP for health maintenance.    Return 1 year for gynecologic exam, or sooner as needed.  All questions answered, pt voiced understanding.        Jonel Pollard MD

## 2022-09-27 LAB
HPV HR 12 DNA SPEC QL NAA+PROBE: NEGATIVE
HPV16 AG SPEC QL: NEGATIVE
HPV18 DNA SPEC QL NAA+PROBE: NEGATIVE

## 2022-09-28 LAB
FINAL PATHOLOGIC DIAGNOSIS: NORMAL
Lab: NORMAL

## 2022-12-27 ENCOUNTER — TELEPHONE (OUTPATIENT)
Dept: OBSTETRICS AND GYNECOLOGY | Facility: CLINIC | Age: 37
End: 2022-12-27
Payer: MEDICAID

## 2022-12-27 NOTE — TELEPHONE ENCOUNTER
----- Message from Tabitha Kyle sent at 12/27/2022  2:27 PM CST -----  Regarding: Request for Sooner Apt  Type:  Sooner Appointment Request    Patient is requesting a sooner appointment.  Patient declined first available appointment listed as well as another facility and provider .  Patient will not accept being placed on the waitlist and is requesting a message be sent to doctor.    Name of Caller: Self     When is the first available appointment? 1/25/23    Symptoms: She took multiple pregnancy tests and they all were positive. She also started spotting today.     Would the patient rather a call back or a response via [facility]? Call     Best Call Back Number: .[phone]

## 2022-12-27 NOTE — TELEPHONE ENCOUNTER
Pt call was returned. Pt advised to go to ED if bleeding worsens or cramping begins. Pt is the early stages of pregnancy and was advised that it could be implantation bleeding. Pt verbalized understanding.

## 2022-12-30 ENCOUNTER — PATIENT MESSAGE (OUTPATIENT)
Dept: OBSTETRICS AND GYNECOLOGY | Facility: CLINIC | Age: 37
End: 2022-12-30
Payer: MEDICAID

## 2023-01-25 ENCOUNTER — OFFICE VISIT (OUTPATIENT)
Dept: OBSTETRICS AND GYNECOLOGY | Facility: CLINIC | Age: 38
End: 2023-01-25
Payer: MEDICAID

## 2023-01-25 VITALS
DIASTOLIC BLOOD PRESSURE: 84 MMHG | HEIGHT: 66 IN | BODY MASS INDEX: 36.9 KG/M2 | SYSTOLIC BLOOD PRESSURE: 122 MMHG | WEIGHT: 229.63 LBS

## 2023-01-25 DIAGNOSIS — R11.0 NAUSEA: ICD-10-CM

## 2023-01-25 DIAGNOSIS — Z32.00 ENCOUNTER FOR CONFIRMATION OF PREGNANCY TEST RESULT WITH PHYSICAL EXAMINATION: Primary | ICD-10-CM

## 2023-01-25 DIAGNOSIS — Z32.01 PREGNANCY TEST POSITIVE: ICD-10-CM

## 2023-01-25 LAB
B-HCG UR QL: POSITIVE
CTP QC/QA: YES

## 2023-01-25 PROCEDURE — 1160F RVW MEDS BY RX/DR IN RCRD: CPT | Mod: CPTII,,, | Performed by: OBSTETRICS & GYNECOLOGY

## 2023-01-25 PROCEDURE — 87086 URINE CULTURE/COLONY COUNT: CPT | Performed by: OBSTETRICS & GYNECOLOGY

## 2023-01-25 PROCEDURE — 1160F PR REVIEW ALL MEDS BY PRESCRIBER/CLIN PHARMACIST DOCUMENTED: ICD-10-PCS | Mod: CPTII,,, | Performed by: OBSTETRICS & GYNECOLOGY

## 2023-01-25 PROCEDURE — 3008F PR BODY MASS INDEX (BMI) DOCUMENTED: ICD-10-PCS | Mod: CPTII,,, | Performed by: OBSTETRICS & GYNECOLOGY

## 2023-01-25 PROCEDURE — 99204 OFFICE O/P NEW MOD 45 MIN: CPT | Mod: S$PBB,TH,, | Performed by: OBSTETRICS & GYNECOLOGY

## 2023-01-25 PROCEDURE — 3079F DIAST BP 80-89 MM HG: CPT | Mod: CPTII,,, | Performed by: OBSTETRICS & GYNECOLOGY

## 2023-01-25 PROCEDURE — 99999 PR PBB SHADOW E&M-EST. PATIENT-LVL III: ICD-10-PCS | Mod: PBBFAC,,, | Performed by: OBSTETRICS & GYNECOLOGY

## 2023-01-25 PROCEDURE — 3008F BODY MASS INDEX DOCD: CPT | Mod: CPTII,,, | Performed by: OBSTETRICS & GYNECOLOGY

## 2023-01-25 PROCEDURE — 87591 N.GONORRHOEAE DNA AMP PROB: CPT | Performed by: OBSTETRICS & GYNECOLOGY

## 2023-01-25 PROCEDURE — 99213 OFFICE O/P EST LOW 20 MIN: CPT | Mod: PBBFAC,TH | Performed by: OBSTETRICS & GYNECOLOGY

## 2023-01-25 PROCEDURE — 1159F MED LIST DOCD IN RCRD: CPT | Mod: CPTII,,, | Performed by: OBSTETRICS & GYNECOLOGY

## 2023-01-25 PROCEDURE — 1159F PR MEDICATION LIST DOCUMENTED IN MEDICAL RECORD: ICD-10-PCS | Mod: CPTII,,, | Performed by: OBSTETRICS & GYNECOLOGY

## 2023-01-25 PROCEDURE — 99204 PR OFFICE/OUTPT VISIT, NEW, LEVL IV, 45-59 MIN: ICD-10-PCS | Mod: S$PBB,TH,, | Performed by: OBSTETRICS & GYNECOLOGY

## 2023-01-25 PROCEDURE — 99999 PR PBB SHADOW E&M-EST. PATIENT-LVL III: CPT | Mod: PBBFAC,,, | Performed by: OBSTETRICS & GYNECOLOGY

## 2023-01-25 PROCEDURE — 81025 URINE PREGNANCY TEST: CPT | Mod: PBBFAC | Performed by: OBSTETRICS & GYNECOLOGY

## 2023-01-25 PROCEDURE — 3074F PR MOST RECENT SYSTOLIC BLOOD PRESSURE < 130 MM HG: ICD-10-PCS | Mod: CPTII,,, | Performed by: OBSTETRICS & GYNECOLOGY

## 2023-01-25 PROCEDURE — 3079F PR MOST RECENT DIASTOLIC BLOOD PRESSURE 80-89 MM HG: ICD-10-PCS | Mod: CPTII,,, | Performed by: OBSTETRICS & GYNECOLOGY

## 2023-01-25 PROCEDURE — 3074F SYST BP LT 130 MM HG: CPT | Mod: CPTII,,, | Performed by: OBSTETRICS & GYNECOLOGY

## 2023-01-25 RX ORDER — ONDANSETRON 4 MG/1
4 TABLET, ORALLY DISINTEGRATING ORAL EVERY 6 HOURS PRN
Qty: 30 TABLET | Refills: 1 | Status: ON HOLD | OUTPATIENT
Start: 2023-01-25 | End: 2023-08-25 | Stop reason: HOSPADM

## 2023-01-25 NOTE — PROGRESS NOTES
"[facility]  Ambulatory Clinic  Obstetrics & Gynecology    Visit Date:  2023     Chief Complaint:  Missed my period    History of Present Illness:      Ghanshyam Harrison is a 37 y.o. , UPT positive today, here with c/o missed period.    LMP Patient's last menstrual period was 2022.      Pt has no major complaints today and denies any vaginal bleeding, discharge, pain, GI/ compliants.      Pt reports overall good health.    OB History    Para Term  AB Living   2 1 1 0 1 1   SAB IAB Ectopic Multiple Live Births   1 0 0 0 1      # Outcome Date GA Lbr Raul/2nd Weight Sex Delivery Anes PTL Lv   2 Term 18 37w4d  2.835 kg (6 lb 4 oz) F Vag-Spont EPI N GINETTE      Name: BERNA HARRISON, FRANCISCO KAY      Apgar1: 9  Apgar5: 9   1 SAB              PMH - obesity  PSH - cholecystectomy, suction D&C  SH - denies tobacco, etoh, drugs  FH - denies congenital defects, aneuploidy   GYN Hx - denies STI, abn pap  MEDS - PNV    Review of patient's allergies indicates:   Allergen Reactions    Ace inhibitors Anaphylaxis    Amoxicillin Anaphylaxis    Lisinopril Anaphylaxis    Penicillins Anaphylaxis    Sulfamethoxazole-trimethoprim Anaphylaxis    Tramadol Itching     Review of Systems:      Constitutional:  No fever, fatigue  HENT:  No congestion, hearing changes  Eyes:  No visual disturbance  Respiratory:  No cough, shortness of breath  Cardiovascular:  No chest pain, leg swelling  Breast:  No lump, pain, nipple discharge, redness, skin changes  Gastrointestinal:  No abdominal pain, constipation, blood in stool   Genitourinary:  No dysuria, frequency  Endocrine:  No heat or cold intolerance  Musculoskeletal:  No back pain, arthralgias  Skin:  No rash, jaundice  Neurological:  No dizziness, weakness, headaches  Psychiatric/Behavioral:  No sleep disturbance, dysphoric mood     Physical Exam:     /84   Ht 5' 6" (1.676 m)   Wt 104.2 kg (229 lb 9.8 oz)   LMP 2022   BMI " 37.06 kg/m²      GENERAL:  NAD. Well-nourished. A&Ox3.  HEENT:  NCAT, EOMI, moist mucus membranes.  Neck supple w/o masses.  BREAST:  Patient deferred today  LUNGS:  CTA-B.  HEART:  RRR, physiologic heart sounds.  ABDOMEN:  Soft, non-tender. Normoactive BS.  No obvious organomegaly.    EXT:  Symmetric w/o cramping, claudication, or edema. +2 distal pulses.   SKIN:  No rashes  NEURO:  Grossly intact bilaterally.   PSYCH:  Mood & affect appropriate.     PELVIC:  Patient deferred today    Chaperone present for exam.    VSCAN shows early IUP.    Assessment:     37 y.o. , UPT positive, LMP 22, here for confirmation of pregnancy  Maternal obesity - Body mass index is 37.06 kg/m².  Nausea     Plan:    We discussed principles of prenatal care, weight gain goals, dietary/lifestyle modifications, pregnancy care instructions and precautions.  Prenatal educational material given to pt.  Continue prenatal vitamins.      Discussed implications of obesity on pregnancy.  Weight gain goals discussed. Encourage healthy lifestyle modifications.  Order A1c.    Pt requesting refill of zofran to use prn for nausea/vomiting. Risks, benefits, and alternatives to zofran reviewed. Dietary advice. Cautioned on drossiness, no driving or operating machinery.    Return in 2 weeks for OB visit, or sooner prn. All questions answered, pt voiced understanding.      Jonel Pollard MD

## 2023-01-27 ENCOUNTER — PATIENT MESSAGE (OUTPATIENT)
Dept: MATERNAL FETAL MEDICINE | Facility: CLINIC | Age: 38
End: 2023-01-27
Payer: MEDICAID

## 2023-01-27 LAB — BACTERIA UR CULT: NORMAL

## 2023-01-28 LAB
C TRACH DNA SPEC QL NAA+PROBE: NOT DETECTED
N GONORRHOEA DNA SPEC QL NAA+PROBE: NOT DETECTED

## 2023-01-30 ENCOUNTER — PROCEDURE VISIT (OUTPATIENT)
Dept: MATERNAL FETAL MEDICINE | Facility: CLINIC | Age: 38
End: 2023-01-30
Payer: MEDICAID

## 2023-01-30 DIAGNOSIS — Z36.89 ENCOUNTER FOR FETAL ANATOMIC SURVEY: Primary | ICD-10-CM

## 2023-01-30 DIAGNOSIS — Z32.01 PREGNANCY TEST POSITIVE: ICD-10-CM

## 2023-01-30 PROCEDURE — 76801 OB US < 14 WKS SINGLE FETUS: CPT | Mod: PBBFAC,PO | Performed by: OBSTETRICS & GYNECOLOGY

## 2023-01-30 PROCEDURE — 76801 US OB/GYN EXTENDED PROCEDURE (VIEWPOINT): ICD-10-PCS | Mod: 26,S$PBB,, | Performed by: OBSTETRICS & GYNECOLOGY

## 2023-02-15 ENCOUNTER — LAB VISIT (OUTPATIENT)
Dept: LAB | Facility: HOSPITAL | Age: 38
End: 2023-02-15
Attending: OBSTETRICS & GYNECOLOGY
Payer: MEDICAID

## 2023-02-15 ENCOUNTER — INITIAL PRENATAL (OUTPATIENT)
Dept: OBSTETRICS AND GYNECOLOGY | Facility: CLINIC | Age: 38
End: 2023-02-15
Payer: MEDICAID

## 2023-02-15 VITALS
DIASTOLIC BLOOD PRESSURE: 66 MMHG | SYSTOLIC BLOOD PRESSURE: 122 MMHG | WEIGHT: 232.94 LBS | HEART RATE: 70 BPM | BODY MASS INDEX: 37.59 KG/M2

## 2023-02-15 DIAGNOSIS — Z3A.11 11 WEEKS GESTATION OF PREGNANCY: ICD-10-CM

## 2023-02-15 DIAGNOSIS — Z3A.11 11 WEEKS GESTATION OF PREGNANCY: Primary | ICD-10-CM

## 2023-02-15 LAB
ABO + RH BLD: NORMAL
ALBUMIN SERPL BCP-MCNC: 3.2 G/DL (ref 3.5–5.2)
ALP SERPL-CCNC: 76 U/L (ref 55–135)
ALT SERPL W/O P-5'-P-CCNC: 18 U/L (ref 10–44)
ANION GAP SERPL CALC-SCNC: 8 MMOL/L (ref 8–16)
AST SERPL-CCNC: 14 U/L (ref 10–40)
BASOPHILS # BLD AUTO: 0.03 K/UL (ref 0–0.2)
BASOPHILS NFR BLD: 0.4 % (ref 0–1.9)
BILIRUB SERPL-MCNC: 0.2 MG/DL (ref 0.1–1)
BLD GP AB SCN CELLS X3 SERPL QL: NORMAL
BUN SERPL-MCNC: 7 MG/DL (ref 6–20)
CALCIUM SERPL-MCNC: 9.1 MG/DL (ref 8.7–10.5)
CHLORIDE SERPL-SCNC: 107 MMOL/L (ref 95–110)
CO2 SERPL-SCNC: 21 MMOL/L (ref 23–29)
CREAT SERPL-MCNC: 0.7 MG/DL (ref 0.5–1.4)
DIFFERENTIAL METHOD: ABNORMAL
EOSINOPHIL # BLD AUTO: 0 K/UL (ref 0–0.5)
EOSINOPHIL NFR BLD: 0.5 % (ref 0–8)
ERYTHROCYTE [DISTWIDTH] IN BLOOD BY AUTOMATED COUNT: 15.4 % (ref 11.5–14.5)
EST. GFR  (NO RACE VARIABLE): >60 ML/MIN/1.73 M^2
GLUCOSE SERPL-MCNC: 92 MG/DL (ref 70–110)
HCT VFR BLD AUTO: 31.8 % (ref 37–48.5)
HGB BLD-MCNC: 10.3 G/DL (ref 12–16)
IMM GRANULOCYTES # BLD AUTO: 0.02 K/UL (ref 0–0.04)
IMM GRANULOCYTES NFR BLD AUTO: 0.3 % (ref 0–0.5)
LYMPHOCYTES # BLD AUTO: 2.6 K/UL (ref 1–4.8)
LYMPHOCYTES NFR BLD: 33.5 % (ref 18–48)
MCH RBC QN AUTO: 25.9 PG (ref 27–31)
MCHC RBC AUTO-ENTMCNC: 32.4 G/DL (ref 32–36)
MCV RBC AUTO: 80 FL (ref 82–98)
MONOCYTES # BLD AUTO: 0.5 K/UL (ref 0.3–1)
MONOCYTES NFR BLD: 6.3 % (ref 4–15)
NEUTROPHILS # BLD AUTO: 4.5 K/UL (ref 1.8–7.7)
NEUTROPHILS NFR BLD: 59 % (ref 38–73)
NRBC BLD-RTO: 0 /100 WBC
PLATELET # BLD AUTO: 331 K/UL (ref 150–450)
PMV BLD AUTO: 9.4 FL (ref 9.2–12.9)
POTASSIUM SERPL-SCNC: 3.6 MMOL/L (ref 3.5–5.1)
PROT SERPL-MCNC: 7.1 G/DL (ref 6–8.4)
RBC # BLD AUTO: 3.97 M/UL (ref 4–5.4)
SODIUM SERPL-SCNC: 136 MMOL/L (ref 136–145)
WBC # BLD AUTO: 7.61 K/UL (ref 3.9–12.7)

## 2023-02-15 PROCEDURE — 86900 BLOOD TYPING SEROLOGIC ABO: CPT | Performed by: OBSTETRICS & GYNECOLOGY

## 2023-02-15 PROCEDURE — 99999 PR PBB SHADOW E&M-EST. PATIENT-LVL III: ICD-10-PCS | Mod: PBBFAC,,, | Performed by: OBSTETRICS & GYNECOLOGY

## 2023-02-15 PROCEDURE — 86803 HEPATITIS C AB TEST: CPT | Performed by: OBSTETRICS & GYNECOLOGY

## 2023-02-15 PROCEDURE — 99213 OFFICE O/P EST LOW 20 MIN: CPT | Mod: PBBFAC | Performed by: OBSTETRICS & GYNECOLOGY

## 2023-02-15 PROCEDURE — 99214 PR OFFICE/OUTPT VISIT, EST, LEVL IV, 30-39 MIN: ICD-10-PCS | Mod: TH,S$PBB,, | Performed by: OBSTETRICS & GYNECOLOGY

## 2023-02-15 PROCEDURE — 86592 SYPHILIS TEST NON-TREP QUAL: CPT | Performed by: OBSTETRICS & GYNECOLOGY

## 2023-02-15 PROCEDURE — 87340 HEPATITIS B SURFACE AG IA: CPT | Performed by: OBSTETRICS & GYNECOLOGY

## 2023-02-15 PROCEDURE — 83036 HEMOGLOBIN GLYCOSYLATED A1C: CPT | Performed by: OBSTETRICS & GYNECOLOGY

## 2023-02-15 PROCEDURE — 99999 PR PBB SHADOW E&M-EST. PATIENT-LVL III: CPT | Mod: PBBFAC,,, | Performed by: OBSTETRICS & GYNECOLOGY

## 2023-02-15 PROCEDURE — 87389 HIV-1 AG W/HIV-1&-2 AB AG IA: CPT | Performed by: OBSTETRICS & GYNECOLOGY

## 2023-02-15 PROCEDURE — 86762 RUBELLA ANTIBODY: CPT | Performed by: OBSTETRICS & GYNECOLOGY

## 2023-02-15 PROCEDURE — 99214 OFFICE O/P EST MOD 30 MIN: CPT | Mod: TH,S$PBB,, | Performed by: OBSTETRICS & GYNECOLOGY

## 2023-02-15 PROCEDURE — 36415 COLL VENOUS BLD VENIPUNCTURE: CPT | Performed by: OBSTETRICS & GYNECOLOGY

## 2023-02-15 PROCEDURE — 85025 COMPLETE CBC W/AUTO DIFF WBC: CPT | Performed by: OBSTETRICS & GYNECOLOGY

## 2023-02-15 PROCEDURE — 80053 COMPREHEN METABOLIC PANEL: CPT | Performed by: OBSTETRICS & GYNECOLOGY

## 2023-02-15 RX ORDER — ASPIRIN 81 MG/1
81 TABLET ORAL DAILY
Status: ON HOLD | COMMUNITY
End: 2023-08-25 | Stop reason: HOSPADM

## 2023-02-15 NOTE — PROGRESS NOTES
37 y.o.  at 11w1d here for initial OB visit.    Pregnancy complicated by:  Maternal obesity - Body mass index is 37.59 kg/m².  AMA    Pt has no major complaints today.  Denies vaginal bleeding, leakage of fluid, or contractions.    Discussed implications of AMA on pregnancy.  Pt declined first trimester aneuploidy screening, and state she would not terminate the pregnancy for any reasons.  Refer to MFM as pregnancy progesses.    Discussed implications of obesity on pregnancy.    Weight gain goals discussed.   Encourage healthy lifestyle modifications.    Order A1c.    Order initial OB lab profile.  Discussed daily low dose ASA ~12 wks for prevention of preE.  Discussed Connective MOM program.  Encourage Covid and flu vaccine.    Principles of prenatal care and precautions reviewed.  Return 4 wks or sooner prn.  All questions answered, voiced understanding.      Jonel Pollard MD

## 2023-02-16 LAB
ESTIMATED AVG GLUCOSE: 108 MG/DL (ref 68–131)
HBA1C MFR BLD: 5.4 % (ref 4–5.6)
HBV SURFACE AG SERPL QL IA: NORMAL
HCV AB SERPL QL IA: NORMAL
HIV 1+2 AB+HIV1 P24 AG SERPL QL IA: NORMAL

## 2023-02-17 LAB
RPR SER QL: NORMAL
RUBV IGG SER-ACNC: 25.6 IU/ML
RUBV IGG SER-IMP: REACTIVE

## 2023-03-02 ENCOUNTER — PATIENT MESSAGE (OUTPATIENT)
Dept: MATERNAL FETAL MEDICINE | Facility: CLINIC | Age: 38
End: 2023-03-02
Payer: MEDICAID

## 2023-03-28 ENCOUNTER — PATIENT MESSAGE (OUTPATIENT)
Dept: OTHER | Facility: OTHER | Age: 38
End: 2023-03-28
Payer: MEDICAID

## 2023-04-14 ENCOUNTER — PATIENT MESSAGE (OUTPATIENT)
Dept: MATERNAL FETAL MEDICINE | Facility: CLINIC | Age: 38
End: 2023-04-14
Payer: MEDICAID

## 2023-04-17 ENCOUNTER — PROCEDURE VISIT (OUTPATIENT)
Dept: MATERNAL FETAL MEDICINE | Facility: CLINIC | Age: 38
End: 2023-04-17
Payer: MEDICAID

## 2023-04-17 DIAGNOSIS — Z36.2 ENCOUNTER FOR FOLLOW-UP ULTRASOUND OF FETAL ANATOMY: Primary | ICD-10-CM

## 2023-04-17 DIAGNOSIS — Z36.89 ENCOUNTER FOR FETAL ANATOMIC SURVEY: ICD-10-CM

## 2023-04-17 PROCEDURE — 76811 OB US DETAILED SNGL FETUS: CPT | Mod: PBBFAC,PO | Performed by: OBSTETRICS & GYNECOLOGY

## 2023-04-17 PROCEDURE — 76811 US MFM PROCEDURE (VIEWPOINT): ICD-10-PCS | Mod: 26,S$PBB,, | Performed by: OBSTETRICS & GYNECOLOGY

## 2023-04-18 ENCOUNTER — PATIENT MESSAGE (OUTPATIENT)
Dept: OTHER | Facility: OTHER | Age: 38
End: 2023-04-18
Payer: MEDICAID

## 2023-04-26 ENCOUNTER — ROUTINE PRENATAL (OUTPATIENT)
Dept: OBSTETRICS AND GYNECOLOGY | Facility: CLINIC | Age: 38
End: 2023-04-26
Payer: MEDICAID

## 2023-04-26 ENCOUNTER — PATIENT MESSAGE (OUTPATIENT)
Dept: ADMINISTRATIVE | Facility: OTHER | Age: 38
End: 2023-04-26
Payer: MEDICAID

## 2023-04-26 VITALS
DIASTOLIC BLOOD PRESSURE: 60 MMHG | SYSTOLIC BLOOD PRESSURE: 118 MMHG | WEIGHT: 237.44 LBS | BODY MASS INDEX: 38.32 KG/M2

## 2023-04-26 DIAGNOSIS — Z3A.21 21 WEEKS GESTATION OF PREGNANCY: Primary | ICD-10-CM

## 2023-04-26 DIAGNOSIS — O99.019 ANTEPARTUM ANEMIA: ICD-10-CM

## 2023-04-26 PROCEDURE — 99213 OFFICE O/P EST LOW 20 MIN: CPT | Mod: TH,S$PBB,, | Performed by: OBSTETRICS & GYNECOLOGY

## 2023-04-26 PROCEDURE — 99213 PR OFFICE/OUTPT VISIT, EST, LEVL III, 20-29 MIN: ICD-10-PCS | Mod: TH,S$PBB,, | Performed by: OBSTETRICS & GYNECOLOGY

## 2023-04-26 PROCEDURE — 99999 PR PBB SHADOW E&M-EST. PATIENT-LVL III: ICD-10-PCS | Mod: PBBFAC,,, | Performed by: OBSTETRICS & GYNECOLOGY

## 2023-04-26 PROCEDURE — 99213 OFFICE O/P EST LOW 20 MIN: CPT | Mod: PBBFAC,TH | Performed by: OBSTETRICS & GYNECOLOGY

## 2023-04-26 PROCEDURE — 99999 PR PBB SHADOW E&M-EST. PATIENT-LVL III: CPT | Mod: PBBFAC,,, | Performed by: OBSTETRICS & GYNECOLOGY

## 2023-04-26 RX ORDER — VITAMIN A ACETATE, ASCORBIC ACID, CHOLECALCIFEROL, ALPHA-TOCOPHEROL ACETATE, DL, THIAMINE MONONITRATE, RIBOFLAVIN, NIACINAMIDE, PYRIDOXINE HYDROCHLORIDE, FOLIC ACID, CYANOCOBALAMIN, BIOTIN, CALCIUM PANTOTHENATE, CALCIUM CARBONATE, FERROUS FUMARATE, POTASSIUM IODIDE, MAGNESIUM OXIDE, ZINC OXIDE AND CUPRIC OXIDE 2500; 80; 400; 10; 3; 3.4; 20; 20; 1; 12; 300; 6; 120; 27; 150; 30; 15; 2 [IU]/1; MG/1; [IU]/1; [IU]/1; MG/1; MG/1; MG/1; MG/1; MG/1; UG/1; UG/1; MG/1; MG/1; MG/1; UG/1; UG/1; UG/1; MG/1
1 TABLET, FILM COATED ORAL
COMMUNITY
Start: 2023-01-25

## 2023-04-26 RX ORDER — FERROUS SULFATE 325(65) MG
325 TABLET, DELAYED RELEASE (ENTERIC COATED) ORAL 2 TIMES DAILY
Qty: 60 TABLET | Refills: 3 | Status: SHIPPED | OUTPATIENT
Start: 2023-04-26 | End: 2023-07-27 | Stop reason: SDUPTHER

## 2023-04-26 NOTE — PROGRESS NOTES
38 y.o.  at 21w1d here for OB visit.    Pregnancy complicated by:  Maternal obesity - Body mass index is 38.32 kg/m².  AMA  Anemia, iron deficiency      Last visit 2/15.  Importance of prenatal visits discussed.    Pt has no major complaints today.  Reports active fetus.  Denies vaginal bleeding, leakage of fluid, or contractions.    Pt seen MFM on  for anatomy US.    Impression   =========   A detailed fetal anatomic ultrasound examination was performed for the following high risk indication: AMA   No fetal structural malformations are identified; however, fetal imaging is incomplete today.   A follow-up study will be scheduled to complete the fetal anatomic survey.   Fetal size today is consistent with established gestational age.   Cervical length by TA scanning is normal.   Placental location is anterior, posterior, right lateral without evidence of previa.     Recommendation   ==============   Return appointment scheduled in 4-6 weeks to complete the anatomic survey.     Discussed implications of AMA on pregnancy.  Order cfDNA testing today, [facility]    Discussed implications of obesity on pregnancy.    Weight gain goals discussed.   Encourage healthy lifestyle modifications.    Last A1c 5.4.    Initial OB lab results d/w pt.  Discussed anemia, Fe supplements, dietary advice.  Encourage daily low dose ASA for prevention of preE.  Encourage participation Connective MOM program.  Encourage Covid and flu vaccine.    Labor/preE precautions.  Kick counts.  Return 4 wks or sooner prn.  All questions answered, voiced understanding.      Jonel Pollard MD

## 2023-04-26 NOTE — LETTER
April 26, 2023      [facility] - OB GYN  [address]  [place] [address]  Phone: [phone]       Patient: Ghanshyam Harrison   YOB: 1985  Date of Visit: 04/26/2023    To Whom It May Concern:    Shazia Harrison  was at [facility] on 04/26/2023. The patient may return to work/school on 4/26/2023 with no restrictions. If you have any questions or concerns, or if I can be of further assistance, please do not hesitate to contact me.    Sincerely,    Cass Cameron MA

## 2023-04-28 ENCOUNTER — PATIENT MESSAGE (OUTPATIENT)
Dept: OBSTETRICS AND GYNECOLOGY | Facility: CLINIC | Age: 38
End: 2023-04-28
Payer: MEDICAID

## 2023-04-28 DIAGNOSIS — B37.31 VAGINAL YEAST INFECTION: Primary | ICD-10-CM

## 2023-04-29 RX ORDER — TERCONAZOLE 8 MG/G
1 CREAM VAGINAL NIGHTLY
Qty: 20 G | Refills: 0 | Status: SHIPPED | OUTPATIENT
Start: 2023-04-29 | End: 2023-05-02

## 2023-05-16 ENCOUNTER — PATIENT MESSAGE (OUTPATIENT)
Dept: OTHER | Facility: OTHER | Age: 38
End: 2023-05-16
Payer: MEDICAID

## 2023-05-17 ENCOUNTER — PATIENT MESSAGE (OUTPATIENT)
Dept: MATERNAL FETAL MEDICINE | Facility: CLINIC | Age: 38
End: 2023-05-17
Payer: MEDICAID

## 2023-05-18 ENCOUNTER — PROCEDURE VISIT (OUTPATIENT)
Dept: MATERNAL FETAL MEDICINE | Facility: CLINIC | Age: 38
End: 2023-05-18
Payer: MEDICAID

## 2023-05-18 DIAGNOSIS — Z36.2 ENCOUNTER FOR FOLLOW-UP ULTRASOUND OF FETAL ANATOMY: ICD-10-CM

## 2023-05-18 DIAGNOSIS — Z36.89 ENCOUNTER FOR ULTRASOUND TO ASSESS FETAL GROWTH: Primary | ICD-10-CM

## 2023-05-18 PROCEDURE — 76816 US MFM PROCEDURE (VIEWPOINT): ICD-10-PCS | Mod: 26,S$PBB,, | Performed by: OBSTETRICS & GYNECOLOGY

## 2023-05-18 PROCEDURE — 76816 OB US FOLLOW-UP PER FETUS: CPT | Mod: PBBFAC,PO | Performed by: OBSTETRICS & GYNECOLOGY

## 2023-05-24 ENCOUNTER — ROUTINE PRENATAL (OUTPATIENT)
Dept: OBSTETRICS AND GYNECOLOGY | Facility: CLINIC | Age: 38
End: 2023-05-24
Payer: MEDICAID

## 2023-05-24 VITALS
WEIGHT: 241.94 LBS | SYSTOLIC BLOOD PRESSURE: 120 MMHG | DIASTOLIC BLOOD PRESSURE: 54 MMHG | BODY MASS INDEX: 39.05 KG/M2

## 2023-05-24 DIAGNOSIS — O99.210 OBESITY IN PREGNANCY, ANTEPARTUM, UNSPECIFIED TRIMESTER: ICD-10-CM

## 2023-05-24 DIAGNOSIS — O09.522 MULTIGRAVIDA OF ADVANCED MATERNAL AGE IN SECOND TRIMESTER: ICD-10-CM

## 2023-05-24 DIAGNOSIS — Z3A.25 25 WEEKS GESTATION OF PREGNANCY: Primary | ICD-10-CM

## 2023-05-24 PROCEDURE — 99999 PR PBB SHADOW E&M-EST. PATIENT-LVL III: CPT | Mod: PBBFAC,,, | Performed by: OBSTETRICS & GYNECOLOGY

## 2023-05-24 PROCEDURE — 99213 OFFICE O/P EST LOW 20 MIN: CPT | Mod: PBBFAC,TH | Performed by: OBSTETRICS & GYNECOLOGY

## 2023-05-24 PROCEDURE — 99213 PR OFFICE/OUTPT VISIT, EST, LEVL III, 20-29 MIN: ICD-10-PCS | Mod: TH,S$PBB,, | Performed by: OBSTETRICS & GYNECOLOGY

## 2023-05-24 PROCEDURE — 99213 OFFICE O/P EST LOW 20 MIN: CPT | Mod: TH,S$PBB,, | Performed by: OBSTETRICS & GYNECOLOGY

## 2023-05-24 PROCEDURE — 99999 PR PBB SHADOW E&M-EST. PATIENT-LVL III: ICD-10-PCS | Mod: PBBFAC,,, | Performed by: OBSTETRICS & GYNECOLOGY

## 2023-05-24 RX ORDER — FERROUS SULFATE 325(65) MG
TABLET ORAL 2 TIMES DAILY
COMMUNITY
Start: 2023-04-26 | End: 2023-05-24 | Stop reason: SDUPTHER

## 2023-05-24 NOTE — PROGRESS NOTES
38 y.o.  at 25w1d here for OB visit.    Pregnancy complicated by:  AMA  cfDNA negative XY  Maternal obesity - Body mass index is 39.05 kg/m².  Anemia, iron deficiency      Pt has no major complaints today.  Reports active fetus.  Denies vaginal bleeding, leakage of fluid, or contractions.    Pt seen MFM on  for f/u US:    Impression   =========   [place] live IUP.   The fetal anatomic survey was completed today, and no fetal structural abnormalities were identified of the structures imaged today.   Interval fetal growth has been normal (EFW 48%, AC 61%).   MVP is normal.     Recommendation   ==============   F/U growth scan at 32 weeks (indication: AMA) to be scheduled by MFM     cfDNA negative XY    Discussed implications of obesity on pregnancy.    Weight gain goals discussed.   Encourage healthy lifestyle modifications.    Order 1 hr glucola, CBC.    Encourage fergon for anemia.  Encourage daily low dose ASA for prevention of preE.  Encourage participation Connective MOM program.  Encourage Covid and flu vaccine.    Labor/preE precautions.  Kick counts.  Return 4 wks or sooner prn.  All questions answered, voiced understanding.      Jonel oPllard MD

## 2023-05-29 ENCOUNTER — LAB VISIT (OUTPATIENT)
Dept: LAB | Facility: HOSPITAL | Age: 38
End: 2023-05-29
Attending: OBSTETRICS & GYNECOLOGY
Payer: MEDICAID

## 2023-05-29 DIAGNOSIS — Z3A.25 25 WEEKS GESTATION OF PREGNANCY: ICD-10-CM

## 2023-05-29 LAB
BASOPHILS # BLD AUTO: 0.02 K/UL (ref 0–0.2)
BASOPHILS NFR BLD: 0.2 % (ref 0–1.9)
DIFFERENTIAL METHOD: ABNORMAL
EOSINOPHIL # BLD AUTO: 0.1 K/UL (ref 0–0.5)
EOSINOPHIL NFR BLD: 0.8 % (ref 0–8)
ERYTHROCYTE [DISTWIDTH] IN BLOOD BY AUTOMATED COUNT: 13.7 % (ref 11.5–14.5)
GLUCOSE SERPL-MCNC: 118 MG/DL (ref 70–140)
HCT VFR BLD AUTO: 28.9 % (ref 37–48.5)
HGB BLD-MCNC: 9.2 G/DL (ref 12–16)
IMM GRANULOCYTES # BLD AUTO: 0.05 K/UL (ref 0–0.04)
IMM GRANULOCYTES NFR BLD AUTO: 0.6 % (ref 0–0.5)
LYMPHOCYTES # BLD AUTO: 2.5 K/UL (ref 1–4.8)
LYMPHOCYTES NFR BLD: 28.3 % (ref 18–48)
MCH RBC QN AUTO: 26 PG (ref 27–31)
MCHC RBC AUTO-ENTMCNC: 31.8 G/DL (ref 32–36)
MCV RBC AUTO: 82 FL (ref 82–98)
MONOCYTES # BLD AUTO: 0.4 K/UL (ref 0.3–1)
MONOCYTES NFR BLD: 4.6 % (ref 4–15)
NEUTROPHILS # BLD AUTO: 5.8 K/UL (ref 1.8–7.7)
NEUTROPHILS NFR BLD: 65.5 % (ref 38–73)
NRBC BLD-RTO: 0 /100 WBC
PLATELET # BLD AUTO: 358 K/UL (ref 150–450)
PMV BLD AUTO: 10 FL (ref 9.2–12.9)
RBC # BLD AUTO: 3.54 M/UL (ref 4–5.4)
WBC # BLD AUTO: 8.91 K/UL (ref 3.9–12.7)

## 2023-05-29 PROCEDURE — 82950 GLUCOSE TEST: CPT | Performed by: OBSTETRICS & GYNECOLOGY

## 2023-05-29 PROCEDURE — 36415 COLL VENOUS BLD VENIPUNCTURE: CPT | Performed by: OBSTETRICS & GYNECOLOGY

## 2023-05-29 PROCEDURE — 85025 COMPLETE CBC W/AUTO DIFF WBC: CPT | Performed by: OBSTETRICS & GYNECOLOGY

## 2023-05-30 ENCOUNTER — PATIENT MESSAGE (OUTPATIENT)
Dept: OTHER | Facility: OTHER | Age: 38
End: 2023-05-30
Payer: MEDICAID

## 2023-06-06 ENCOUNTER — PATIENT MESSAGE (OUTPATIENT)
Dept: MATERNAL FETAL MEDICINE | Facility: CLINIC | Age: 38
End: 2023-06-06
Payer: MEDICAID

## 2023-06-13 ENCOUNTER — PATIENT MESSAGE (OUTPATIENT)
Dept: OTHER | Facility: OTHER | Age: 38
End: 2023-06-13
Payer: MEDICAID

## 2023-06-27 ENCOUNTER — PATIENT MESSAGE (OUTPATIENT)
Dept: OTHER | Facility: OTHER | Age: 38
End: 2023-06-27
Payer: MEDICAID

## 2023-07-07 ENCOUNTER — ROUTINE PRENATAL (OUTPATIENT)
Dept: OBSTETRICS AND GYNECOLOGY | Facility: CLINIC | Age: 38
End: 2023-07-07
Payer: MEDICAID

## 2023-07-07 ENCOUNTER — CLINICAL SUPPORT (OUTPATIENT)
Dept: OBSTETRICS AND GYNECOLOGY | Facility: CLINIC | Age: 38
End: 2023-07-07
Payer: MEDICAID

## 2023-07-07 VITALS
BODY MASS INDEX: 39.59 KG/M2 | DIASTOLIC BLOOD PRESSURE: 52 MMHG | SYSTOLIC BLOOD PRESSURE: 114 MMHG | WEIGHT: 245.25 LBS

## 2023-07-07 DIAGNOSIS — Z3A.31 31 WEEKS GESTATION OF PREGNANCY: Primary | ICD-10-CM

## 2023-07-07 DIAGNOSIS — O99.210 OBESITY IN PREGNANCY, ANTEPARTUM, UNSPECIFIED TRIMESTER: ICD-10-CM

## 2023-07-07 DIAGNOSIS — O09.522 MULTIGRAVIDA OF ADVANCED MATERNAL AGE IN SECOND TRIMESTER: ICD-10-CM

## 2023-07-07 DIAGNOSIS — Z23 NEED FOR TDAP VACCINATION: Primary | ICD-10-CM

## 2023-07-07 PROCEDURE — 90715 TDAP VACCINE 7 YRS/> IM: CPT | Mod: PBBFAC

## 2023-07-07 PROCEDURE — 90471 IMMUNIZATION ADMIN: CPT | Mod: PBBFAC

## 2023-07-07 PROCEDURE — 99213 PR OFFICE/OUTPT VISIT, EST, LEVL III, 20-29 MIN: ICD-10-PCS | Mod: TH,S$PBB,, | Performed by: OBSTETRICS & GYNECOLOGY

## 2023-07-07 PROCEDURE — 99999 PR PBB SHADOW E&M-EST. PATIENT-LVL III: ICD-10-PCS | Mod: PBBFAC,,, | Performed by: OBSTETRICS & GYNECOLOGY

## 2023-07-07 PROCEDURE — 99999 PR PBB SHADOW E&M-EST. PATIENT-LVL I: ICD-10-PCS | Mod: PBBFAC,,,

## 2023-07-07 PROCEDURE — 99213 OFFICE O/P EST LOW 20 MIN: CPT | Mod: PBBFAC,TH | Performed by: OBSTETRICS & GYNECOLOGY

## 2023-07-07 PROCEDURE — 99213 OFFICE O/P EST LOW 20 MIN: CPT | Mod: TH,S$PBB,, | Performed by: OBSTETRICS & GYNECOLOGY

## 2023-07-07 PROCEDURE — 99999 PR PBB SHADOW E&M-EST. PATIENT-LVL III: CPT | Mod: PBBFAC,,, | Performed by: OBSTETRICS & GYNECOLOGY

## 2023-07-07 PROCEDURE — 99999 PR PBB SHADOW E&M-EST. PATIENT-LVL I: CPT | Mod: PBBFAC,,,

## 2023-07-07 NOTE — PROGRESS NOTES
38 y.o.  at 31w3d here for OB visit.    Pregnancy complicated by:  AMA  cfDNA negative XY  Maternal obesity - Body mass index is 39.59 kg/m².  Anemia, iron deficiency      No major complaints today.  Reports active fetus.  Denies vaginal bleeding, leakage of fluid, or contractions.    Discussed implications of obesity on pregnancy.    Weight gain goals discussed.   Encourage healthy lifestyle modifications.    1 hr glucola normal.  Start wkly NST at 34 wks until delivery secondary to obesity.    Encourage fergon for anemia.  Encourage daily low dose ASA for prevention of preE.  Encourage participation Connective MOM program.  Encourage Covid and flu vaccine.    Labor/preE precautions.  Kick counts.  Return 2 wks or sooner prn.  All questions answered, voiced understanding.      Jonel Pollard MD

## 2023-07-11 ENCOUNTER — PATIENT MESSAGE (OUTPATIENT)
Dept: OTHER | Facility: OTHER | Age: 38
End: 2023-07-11
Payer: MEDICAID

## 2023-07-11 ENCOUNTER — PATIENT MESSAGE (OUTPATIENT)
Dept: MATERNAL FETAL MEDICINE | Facility: CLINIC | Age: 38
End: 2023-07-11
Payer: MEDICAID

## 2023-07-12 ENCOUNTER — PROCEDURE VISIT (OUTPATIENT)
Dept: MATERNAL FETAL MEDICINE | Facility: CLINIC | Age: 38
End: 2023-07-12
Payer: MEDICAID

## 2023-07-12 DIAGNOSIS — Z36.89 ENCOUNTER FOR ULTRASOUND TO ASSESS FETAL GROWTH: ICD-10-CM

## 2023-07-12 PROCEDURE — 76816 OB US FOLLOW-UP PER FETUS: CPT | Mod: PBBFAC,PO | Performed by: OBSTETRICS & GYNECOLOGY

## 2023-07-12 PROCEDURE — 76816 US MFM PROCEDURE (VIEWPOINT): ICD-10-PCS | Mod: 26,S$PBB,, | Performed by: OBSTETRICS & GYNECOLOGY

## 2023-07-19 ENCOUNTER — ROUTINE PRENATAL (OUTPATIENT)
Dept: OBSTETRICS AND GYNECOLOGY | Facility: CLINIC | Age: 38
End: 2023-07-19
Payer: MEDICAID

## 2023-07-19 ENCOUNTER — LAB VISIT (OUTPATIENT)
Dept: LAB | Facility: HOSPITAL | Age: 38
End: 2023-07-19
Attending: OBSTETRICS & GYNECOLOGY
Payer: MEDICAID

## 2023-07-19 ENCOUNTER — CLINICAL SUPPORT (OUTPATIENT)
Dept: OBSTETRICS AND GYNECOLOGY | Facility: CLINIC | Age: 38
End: 2023-07-19
Payer: MEDICAID

## 2023-07-19 VITALS — DIASTOLIC BLOOD PRESSURE: 68 MMHG | BODY MASS INDEX: 39.46 KG/M2 | SYSTOLIC BLOOD PRESSURE: 130 MMHG | WEIGHT: 244.5 LBS

## 2023-07-19 DIAGNOSIS — O99.210 OBESITY IN PREGNANCY, ANTEPARTUM, UNSPECIFIED TRIMESTER: ICD-10-CM

## 2023-07-19 DIAGNOSIS — O13.3 GESTATIONAL HYPERTENSION, THIRD TRIMESTER: ICD-10-CM

## 2023-07-19 DIAGNOSIS — Z3A.33 33 WEEKS GESTATION OF PREGNANCY: ICD-10-CM

## 2023-07-19 DIAGNOSIS — O09.522 MULTIGRAVIDA OF ADVANCED MATERNAL AGE IN SECOND TRIMESTER: ICD-10-CM

## 2023-07-19 DIAGNOSIS — Z3A.33 33 WEEKS GESTATION OF PREGNANCY: Primary | ICD-10-CM

## 2023-07-19 DIAGNOSIS — O99.210 OBESITY IN PREGNANCY, ANTEPARTUM, UNSPECIFIED TRIMESTER: Primary | ICD-10-CM

## 2023-07-19 LAB
ALBUMIN SERPL BCP-MCNC: 2.8 G/DL (ref 3.5–5.2)
ALP SERPL-CCNC: 131 U/L (ref 55–135)
ALT SERPL W/O P-5'-P-CCNC: 11 U/L (ref 10–44)
ANION GAP SERPL CALC-SCNC: 9 MMOL/L (ref 8–16)
AST SERPL-CCNC: 11 U/L (ref 10–40)
BASOPHILS # BLD AUTO: 0.02 K/UL (ref 0–0.2)
BASOPHILS NFR BLD: 0.2 % (ref 0–1.9)
BILIRUB SERPL-MCNC: 0.2 MG/DL (ref 0.1–1)
BUN SERPL-MCNC: 6 MG/DL (ref 6–20)
CALCIUM SERPL-MCNC: 9.3 MG/DL (ref 8.7–10.5)
CHLORIDE SERPL-SCNC: 108 MMOL/L (ref 95–110)
CO2 SERPL-SCNC: 18 MMOL/L (ref 23–29)
CREAT SERPL-MCNC: 0.6 MG/DL (ref 0.5–1.4)
CREAT UR-MCNC: 69.9 MG/DL (ref 15–325)
DIFFERENTIAL METHOD: ABNORMAL
EOSINOPHIL # BLD AUTO: 0.1 K/UL (ref 0–0.5)
EOSINOPHIL NFR BLD: 0.9 % (ref 0–8)
ERYTHROCYTE [DISTWIDTH] IN BLOOD BY AUTOMATED COUNT: 14.5 % (ref 11.5–14.5)
EST. GFR  (NO RACE VARIABLE): >60 ML/MIN/1.73 M^2
GLUCOSE SERPL-MCNC: 92 MG/DL (ref 70–110)
HCT VFR BLD AUTO: 29.6 % (ref 37–48.5)
HGB BLD-MCNC: 9.2 G/DL (ref 12–16)
IMM GRANULOCYTES # BLD AUTO: 0.05 K/UL (ref 0–0.04)
IMM GRANULOCYTES NFR BLD AUTO: 0.6 % (ref 0–0.5)
LYMPHOCYTES # BLD AUTO: 2.3 K/UL (ref 1–4.8)
LYMPHOCYTES NFR BLD: 26 % (ref 18–48)
MCH RBC QN AUTO: 24.9 PG (ref 27–31)
MCHC RBC AUTO-ENTMCNC: 31.1 G/DL (ref 32–36)
MCV RBC AUTO: 80 FL (ref 82–98)
MONOCYTES # BLD AUTO: 0.6 K/UL (ref 0.3–1)
MONOCYTES NFR BLD: 6.4 % (ref 4–15)
NEUTROPHILS # BLD AUTO: 5.9 K/UL (ref 1.8–7.7)
NEUTROPHILS NFR BLD: 65.9 % (ref 38–73)
NRBC BLD-RTO: 0 /100 WBC
PLATELET # BLD AUTO: 378 K/UL (ref 150–450)
PMV BLD AUTO: 9.9 FL (ref 9.2–12.9)
POTASSIUM SERPL-SCNC: 3.5 MMOL/L (ref 3.5–5.1)
PROT SERPL-MCNC: 6.5 G/DL (ref 6–8.4)
PROT UR-MCNC: <7 MG/DL
PROT/CREAT UR: NORMAL MG/G{CREAT} (ref 0–0.2)
RBC # BLD AUTO: 3.69 M/UL (ref 4–5.4)
SODIUM SERPL-SCNC: 135 MMOL/L (ref 136–145)
WBC # BLD AUTO: 8.88 K/UL (ref 3.9–12.7)

## 2023-07-19 PROCEDURE — 99999 PR PBB SHADOW E&M-EST. PATIENT-LVL III: ICD-10-PCS | Mod: PBBFAC,,, | Performed by: OBSTETRICS & GYNECOLOGY

## 2023-07-19 PROCEDURE — 99213 OFFICE O/P EST LOW 20 MIN: CPT | Mod: TH,S$PBB,25, | Performed by: OBSTETRICS & GYNECOLOGY

## 2023-07-19 PROCEDURE — 99999 PR PBB SHADOW E&M-EST. PATIENT-LVL III: CPT | Mod: PBBFAC,,, | Performed by: OBSTETRICS & GYNECOLOGY

## 2023-07-19 PROCEDURE — 99213 OFFICE O/P EST LOW 20 MIN: CPT | Mod: PBBFAC,TH,25 | Performed by: OBSTETRICS & GYNECOLOGY

## 2023-07-19 PROCEDURE — 59025 FETAL NON-STRESS TEST: CPT | Mod: PBBFAC | Performed by: OBSTETRICS & GYNECOLOGY

## 2023-07-19 PROCEDURE — 85025 COMPLETE CBC W/AUTO DIFF WBC: CPT | Performed by: OBSTETRICS & GYNECOLOGY

## 2023-07-19 PROCEDURE — 59025 PR FETAL 2N-STRESS TEST: ICD-10-PCS | Mod: 26,S$PBB,, | Performed by: OBSTETRICS & GYNECOLOGY

## 2023-07-19 PROCEDURE — 99213 PR OFFICE/OUTPT VISIT, EST, LEVL III, 20-29 MIN: ICD-10-PCS | Mod: TH,S$PBB,25, | Performed by: OBSTETRICS & GYNECOLOGY

## 2023-07-19 PROCEDURE — 84156 ASSAY OF PROTEIN URINE: CPT | Performed by: OBSTETRICS & GYNECOLOGY

## 2023-07-19 PROCEDURE — 36415 COLL VENOUS BLD VENIPUNCTURE: CPT | Performed by: OBSTETRICS & GYNECOLOGY

## 2023-07-19 PROCEDURE — 59025 FETAL NON-STRESS TEST: CPT | Mod: 26,S$PBB,, | Performed by: OBSTETRICS & GYNECOLOGY

## 2023-07-19 PROCEDURE — 80053 COMPREHEN METABOLIC PANEL: CPT | Performed by: OBSTETRICS & GYNECOLOGY

## 2023-07-19 NOTE — PROGRESS NOTES
38 y.o.  at 33w1d here for OB visit.    Pregnancy complicated by:  AMA  cfDNA negative XY  Maternal obesity - Body mass index is 39.46 kg/m².  Anemia, iron deficiency    HTN, concern for gestational hypertension at ~33 wks    Pt has no major complaints today.  Reports active fetus.  Denies vaginal bleeding, leakage of fluid, or contractions.    BP elevated today.   Initial /82, repeat 150/86, repeat 130/68 at 15 mins rest.  Pt denies headaches, vision changes, epigastric pain, or acute swelling.  Concern for gestational hypertension.  Order preE labs.  Implications of gHTN on pregnancy discussed.  Start twice wkly NST.  Timing of delivery discussed.    Indications for NST:  Gestational hypertension, AMA, maternal obesity  NST: Baseline 150's, moderate variability, positive acceleration, no decelerations. [place]: No contraction.  Monitored ~20 mins.  MVP: ~5 cm  Continue twice wkly NST, wkly MVP until delivery.    Discussed implications of obesity on pregnancy.    Weight gain goals discussed.    Encourage healthy lifestyle modifications.      Encourage fergon for anemia.  Encourage daily low dose ASA for prevention of preE.  Encourage participation Connective MOM program.  Encourage Covid and flu vaccine.    Labor/preE precautions.  Kick counts.  Return twice wkly for NST or sooner prn.  All questions answered, voiced understanding.      Jonel Pollard MD

## 2023-07-19 NOTE — PROGRESS NOTES
NST completed in prenatal testing clinic. NST reactive and reassuring. Patient denies cxtn, leaking of fluid or vaginal bleeding. Patient reports + fetal movement. Routine OB appt prior to NST.   Plan to schedule patient for twice weekly NST's until delivery, per Dr. Pollard. Patient verbalized understanding.

## 2023-07-24 ENCOUNTER — CLINICAL SUPPORT (OUTPATIENT)
Dept: OBSTETRICS AND GYNECOLOGY | Facility: CLINIC | Age: 38
End: 2023-07-24
Payer: MEDICAID

## 2023-07-24 VITALS — DIASTOLIC BLOOD PRESSURE: 76 MMHG | SYSTOLIC BLOOD PRESSURE: 132 MMHG

## 2023-07-24 DIAGNOSIS — O99.210 OBESITY IN PREGNANCY, ANTEPARTUM, UNSPECIFIED TRIMESTER: ICD-10-CM

## 2023-07-24 DIAGNOSIS — Z3A.33 33 WEEKS GESTATION OF PREGNANCY: Primary | ICD-10-CM

## 2023-07-24 PROCEDURE — 99999 PR PBB SHADOW E&M-EST. PATIENT-LVL I: CPT | Mod: PBBFAC,,,

## 2023-07-24 PROCEDURE — 99999 PR PBB SHADOW E&M-EST. PATIENT-LVL I: ICD-10-PCS | Mod: PBBFAC,,,

## 2023-07-24 PROCEDURE — 99211 OFF/OP EST MAY X REQ PHY/QHP: CPT | Mod: PBBFAC,TH

## 2023-07-27 ENCOUNTER — CLINICAL SUPPORT (OUTPATIENT)
Dept: OBSTETRICS AND GYNECOLOGY | Facility: CLINIC | Age: 38
End: 2023-07-27
Payer: MEDICAID

## 2023-07-27 ENCOUNTER — ROUTINE PRENATAL (OUTPATIENT)
Dept: OBSTETRICS AND GYNECOLOGY | Facility: CLINIC | Age: 38
End: 2023-07-27
Payer: MEDICAID

## 2023-07-27 VITALS
WEIGHT: 244.63 LBS | DIASTOLIC BLOOD PRESSURE: 70 MMHG | SYSTOLIC BLOOD PRESSURE: 128 MMHG | BODY MASS INDEX: 39.48 KG/M2

## 2023-07-27 DIAGNOSIS — Z3A.34 34 WEEKS GESTATION OF PREGNANCY: Primary | ICD-10-CM

## 2023-07-27 DIAGNOSIS — O99.210 OBESITY IN PREGNANCY, ANTEPARTUM, UNSPECIFIED TRIMESTER: ICD-10-CM

## 2023-07-27 DIAGNOSIS — Z3A.34 34 WEEKS GESTATION OF PREGNANCY: ICD-10-CM

## 2023-07-27 DIAGNOSIS — O13.3 GESTATIONAL HYPERTENSION, THIRD TRIMESTER: ICD-10-CM

## 2023-07-27 DIAGNOSIS — O13.3 GESTATIONAL HYPERTENSION, THIRD TRIMESTER: Primary | ICD-10-CM

## 2023-07-27 DIAGNOSIS — O09.522 MULTIGRAVIDA OF ADVANCED MATERNAL AGE IN SECOND TRIMESTER: ICD-10-CM

## 2023-07-27 PROCEDURE — 99213 OFFICE O/P EST LOW 20 MIN: CPT | Mod: PBBFAC,TH,25 | Performed by: OBSTETRICS & GYNECOLOGY

## 2023-07-27 PROCEDURE — 99213 OFFICE O/P EST LOW 20 MIN: CPT | Mod: TH,S$PBB,25, | Performed by: OBSTETRICS & GYNECOLOGY

## 2023-07-27 PROCEDURE — 99999 PR PBB SHADOW E&M-EST. PATIENT-LVL III: ICD-10-PCS | Mod: PBBFAC,,, | Performed by: OBSTETRICS & GYNECOLOGY

## 2023-07-27 PROCEDURE — 59025 FETAL NON-STRESS TEST: CPT | Mod: PBBFAC | Performed by: OBSTETRICS & GYNECOLOGY

## 2023-07-27 PROCEDURE — 59025 PR FETAL 2N-STRESS TEST: ICD-10-PCS | Mod: 26,S$PBB,, | Performed by: OBSTETRICS & GYNECOLOGY

## 2023-07-27 PROCEDURE — 99213 PR OFFICE/OUTPT VISIT, EST, LEVL III, 20-29 MIN: ICD-10-PCS | Mod: TH,S$PBB,25, | Performed by: OBSTETRICS & GYNECOLOGY

## 2023-07-27 PROCEDURE — 99999 PR PBB SHADOW E&M-EST. PATIENT-LVL III: CPT | Mod: PBBFAC,,, | Performed by: OBSTETRICS & GYNECOLOGY

## 2023-07-27 PROCEDURE — 59025 FETAL NON-STRESS TEST: CPT | Mod: 26,S$PBB,, | Performed by: OBSTETRICS & GYNECOLOGY

## 2023-07-27 RX ORDER — FERROUS SULFATE 325(65) MG
TABLET ORAL 2 TIMES DAILY
COMMUNITY
Start: 2023-07-24

## 2023-07-27 NOTE — PROGRESS NOTES
38 y.o.  at 34w2d here for OB visit.    Pregnancy complicated by:  AMA  cfDNA negative XY  Maternal obesity - Body mass index is 39.48 kg/m².  Anemia, iron deficiency    HTN, concern for gestational hypertension at ~33 wks    No major complaints today.  Reports active fetus.  Denies vaginal bleeding, leakage of fluid, or contractions.    BP elevated today.   Pt denies headaches, vision changes, epigastric pain, or acute swelling.  Concern for gestational hypertension.  Implications of gHTN on pregnancy discussed.  Continue twice wkly NST until delivery.  Timing of delivery discussed.    Indications for NST:  Gestational hypertension, AMA, maternal obesity  NST: Baseline 130's, moderate variability, positive acceleration, no decelerations. [place]: irritability.  Monitored ~20 mins.  MVP: ~6 cm  Continue twice wkly NST, wkly MVP until delivery.    Discussed implications of obesity on pregnancy.    Weight gain goals discussed.    Encourage healthy lifestyle modifications.      Encourage fergon for anemia.  Encourage daily low dose ASA for prevention of preE.  Encourage participation Connective MOM program.  Encourage Covid and flu vaccine.    Labor/preE precautions.  Kick counts.  Return twice wkly for NST or sooner prn.  All questions answered, voiced understanding.      Jonel Pollard MD     How Severe Are They?: moderate Is This A New Presentation, Or A Follow-Up?: Follow Up [place]

## 2023-07-31 ENCOUNTER — PATIENT MESSAGE (OUTPATIENT)
Dept: OBSTETRICS AND GYNECOLOGY | Facility: CLINIC | Age: 38
End: 2023-07-31
Payer: MEDICAID

## 2023-08-01 ENCOUNTER — PATIENT MESSAGE (OUTPATIENT)
Dept: OTHER | Facility: OTHER | Age: 38
End: 2023-08-01
Payer: MEDICAID

## 2023-08-03 ENCOUNTER — ROUTINE PRENATAL (OUTPATIENT)
Dept: OBSTETRICS AND GYNECOLOGY | Facility: CLINIC | Age: 38
End: 2023-08-03
Payer: MEDICAID

## 2023-08-03 ENCOUNTER — CLINICAL SUPPORT (OUTPATIENT)
Dept: OBSTETRICS AND GYNECOLOGY | Facility: CLINIC | Age: 38
End: 2023-08-03
Payer: MEDICAID

## 2023-08-03 VITALS
BODY MASS INDEX: 39.84 KG/M2 | DIASTOLIC BLOOD PRESSURE: 70 MMHG | WEIGHT: 246.81 LBS | SYSTOLIC BLOOD PRESSURE: 130 MMHG

## 2023-08-03 DIAGNOSIS — O99.210 OBESITY IN PREGNANCY, ANTEPARTUM, UNSPECIFIED TRIMESTER: ICD-10-CM

## 2023-08-03 DIAGNOSIS — Z3A.35 35 WEEKS GESTATION OF PREGNANCY: ICD-10-CM

## 2023-08-03 DIAGNOSIS — Z3A.35 35 WEEKS GESTATION OF PREGNANCY: Primary | ICD-10-CM

## 2023-08-03 DIAGNOSIS — O09.522 MULTIGRAVIDA OF ADVANCED MATERNAL AGE IN SECOND TRIMESTER: ICD-10-CM

## 2023-08-03 DIAGNOSIS — O13.3 GESTATIONAL HYPERTENSION, THIRD TRIMESTER: ICD-10-CM

## 2023-08-03 DIAGNOSIS — O13.3 GESTATIONAL HYPERTENSION, THIRD TRIMESTER: Primary | ICD-10-CM

## 2023-08-03 PROCEDURE — 99999 PR PBB SHADOW E&M-EST. PATIENT-LVL III: ICD-10-PCS | Mod: PBBFAC,,, | Performed by: OBSTETRICS & GYNECOLOGY

## 2023-08-03 PROCEDURE — 99999 PR PBB SHADOW E&M-EST. PATIENT-LVL III: CPT | Mod: PBBFAC,,, | Performed by: OBSTETRICS & GYNECOLOGY

## 2023-08-03 PROCEDURE — 59025 FETAL NON-STRESS TEST: CPT | Mod: PBBFAC | Performed by: OBSTETRICS & GYNECOLOGY

## 2023-08-03 PROCEDURE — 99213 OFFICE O/P EST LOW 20 MIN: CPT | Mod: PBBFAC,TH,25 | Performed by: OBSTETRICS & GYNECOLOGY

## 2023-08-03 PROCEDURE — 99213 PR OFFICE/OUTPT VISIT, EST, LEVL III, 20-29 MIN: ICD-10-PCS | Mod: TH,S$PBB,25, | Performed by: OBSTETRICS & GYNECOLOGY

## 2023-08-03 PROCEDURE — 99213 OFFICE O/P EST LOW 20 MIN: CPT | Mod: TH,S$PBB,25, | Performed by: OBSTETRICS & GYNECOLOGY

## 2023-08-03 PROCEDURE — 59025 PR FETAL 2N-STRESS TEST: ICD-10-PCS | Mod: 26,S$PBB,, | Performed by: OBSTETRICS & GYNECOLOGY

## 2023-08-03 PROCEDURE — 59025 FETAL NON-STRESS TEST: CPT | Mod: 26,S$PBB,, | Performed by: OBSTETRICS & GYNECOLOGY

## 2023-08-03 NOTE — PROGRESS NOTES
38 y.o.  at 35w2d here for OB visit.    Pregnancy complicated by:   AMA  cfDNA negative XY  Maternal obesity   Anemia, iron deficiency    HTN, concern for gestational hypertension at ~33 wks    Pt has no major complaints today.  Reports active fetus.  Denies vaginal bleeding, leakage of fluid, or contractions.    BP normal today.  Pt reports normal range BP at home.  Pt denies headaches, vision changes, epigastric pain, or acute swelling.  Implications of gHTN on pregnancy discussed.  Continue twice wkly NST until delivery.  Timing of delivery discussed.    Indications for NST:  Gestational hypertension, AMA, maternal obesity  NST: Baseline 130's, moderate variability, positive acceleration, no decelerations. [place]: irritability.  Monitored ~20 mins.  MVP: ~6 cm  Continue twice wkly NST, wkly MVP until delivery.    Discussed implications of obesity on pregnancy.    Weight gain goals discussed.    Encourage healthy lifestyle modifications.      Encourage fergon for anemia.  Encourage daily low dose ASA for prevention of preE.  Encourage participation Connective MOM program.  Encourage Covid and flu vaccine.    Labor/preE precautions.  Kick counts.  Return twice wkly for NST or sooner prn.  All questions answered, voiced understanding.      Jonel Pollard MD

## 2023-08-10 ENCOUNTER — CLINICAL SUPPORT (OUTPATIENT)
Dept: OBSTETRICS AND GYNECOLOGY | Facility: CLINIC | Age: 38
End: 2023-08-10
Payer: MEDICAID

## 2023-08-10 ENCOUNTER — ROUTINE PRENATAL (OUTPATIENT)
Dept: OBSTETRICS AND GYNECOLOGY | Facility: CLINIC | Age: 38
End: 2023-08-10
Payer: MEDICAID

## 2023-08-10 VITALS
WEIGHT: 247.38 LBS | BODY MASS INDEX: 39.92 KG/M2 | DIASTOLIC BLOOD PRESSURE: 64 MMHG | SYSTOLIC BLOOD PRESSURE: 148 MMHG

## 2023-08-10 DIAGNOSIS — Z3A.36 36 WEEKS GESTATION OF PREGNANCY: Primary | ICD-10-CM

## 2023-08-10 DIAGNOSIS — O99.210 OBESITY IN PREGNANCY, ANTEPARTUM, UNSPECIFIED TRIMESTER: ICD-10-CM

## 2023-08-10 DIAGNOSIS — O13.3 GESTATIONAL HYPERTENSION, THIRD TRIMESTER: ICD-10-CM

## 2023-08-10 PROCEDURE — 87081 CULTURE SCREEN ONLY: CPT | Performed by: OBSTETRICS & GYNECOLOGY

## 2023-08-10 PROCEDURE — 59025 FETAL NON-STRESS TEST: CPT | Mod: PBBFAC | Performed by: OBSTETRICS & GYNECOLOGY

## 2023-08-10 PROCEDURE — 99213 PR OFFICE/OUTPT VISIT, EST, LEVL III, 20-29 MIN: ICD-10-PCS | Mod: TH,S$PBB,25, | Performed by: OBSTETRICS & GYNECOLOGY

## 2023-08-10 PROCEDURE — 59025 PR FETAL 2N-STRESS TEST: ICD-10-PCS | Mod: 26,S$PBB,, | Performed by: OBSTETRICS & GYNECOLOGY

## 2023-08-10 PROCEDURE — 99999 PR PBB SHADOW E&M-EST. PATIENT-LVL III: CPT | Mod: PBBFAC,,, | Performed by: OBSTETRICS & GYNECOLOGY

## 2023-08-10 PROCEDURE — 99999 PR PBB SHADOW E&M-EST. PATIENT-LVL III: ICD-10-PCS | Mod: PBBFAC,,, | Performed by: OBSTETRICS & GYNECOLOGY

## 2023-08-10 PROCEDURE — 59025 FETAL NON-STRESS TEST: CPT | Mod: 26,S$PBB,, | Performed by: OBSTETRICS & GYNECOLOGY

## 2023-08-10 PROCEDURE — 99213 OFFICE O/P EST LOW 20 MIN: CPT | Mod: PBBFAC,TH,25 | Performed by: OBSTETRICS & GYNECOLOGY

## 2023-08-10 PROCEDURE — 99213 OFFICE O/P EST LOW 20 MIN: CPT | Mod: TH,S$PBB,25, | Performed by: OBSTETRICS & GYNECOLOGY

## 2023-08-10 NOTE — PROGRESS NOTES
38 y.o.  at 36w2d here for OB visit.    Pregnancy complicated by:   AMA  cfDNA negative XY  Maternal obesity   Anemia, iron deficiency    HTN, concern for gestational hypertension at ~33 wks    No major complaints today.  Reports active fetus.  Denies vaginal bleeding, leakage of fluid, or contractions.    Initial /64, repeat 126/72.  Pt reports normal range BP at home.  Pt denies headaches, vision changes, epigastric pain, or acute swelling.  Implications of gHTN on pregnancy discussed.  Continue twice wkly NST until delivery.  Timing of delivery discussed.    Indications for NST:  Gestational hypertension, AMA, maternal obesity  NST: Baseline 130's, moderate variability, positive acceleration, no decelerations. [place]: irritability.  Monitored ~20 mins.  MVP: ~5 cm  Continue twice wkly NST, wkly MVP until delivery.    Order GBS.  Delivery and blood consents signed.    Discussed implications of obesity on pregnancy.    Weight gain goals discussed.    Encourage healthy lifestyle modifications.      Encourage fergon for anemia.  Encourage daily low dose ASA for prevention of preE.  Encourage participation Connective MOM program.  Encourage Covid and flu vaccine.    Labor/preE precautions.  Kick counts.  Return twice wkly for NST or sooner prn.  All questions answered, voiced understanding.      Jonel Pollard MD

## 2023-08-12 LAB — BACTERIA SPEC AEROBE CULT: NORMAL

## 2023-08-17 ENCOUNTER — CLINICAL SUPPORT (OUTPATIENT)
Dept: OBSTETRICS AND GYNECOLOGY | Facility: CLINIC | Age: 38
End: 2023-08-17
Payer: MEDICAID

## 2023-08-17 ENCOUNTER — ROUTINE PRENATAL (OUTPATIENT)
Dept: OBSTETRICS AND GYNECOLOGY | Facility: CLINIC | Age: 38
End: 2023-08-17
Payer: MEDICAID

## 2023-08-17 VITALS
BODY MASS INDEX: 39.82 KG/M2 | DIASTOLIC BLOOD PRESSURE: 62 MMHG | SYSTOLIC BLOOD PRESSURE: 120 MMHG | WEIGHT: 246.69 LBS

## 2023-08-17 DIAGNOSIS — O99.210 OBESITY IN PREGNANCY, ANTEPARTUM, UNSPECIFIED TRIMESTER: ICD-10-CM

## 2023-08-17 DIAGNOSIS — Z3A.37 37 WEEKS GESTATION OF PREGNANCY: Primary | ICD-10-CM

## 2023-08-17 DIAGNOSIS — O09.522 MULTIGRAVIDA OF ADVANCED MATERNAL AGE IN SECOND TRIMESTER: ICD-10-CM

## 2023-08-17 DIAGNOSIS — O13.3 GESTATIONAL HYPERTENSION, THIRD TRIMESTER: ICD-10-CM

## 2023-08-17 PROCEDURE — 59025 FETAL NON-STRESS TEST: CPT | Mod: 26,S$PBB,, | Performed by: OBSTETRICS & GYNECOLOGY

## 2023-08-17 PROCEDURE — 99999 PR PBB SHADOW E&M-EST. PATIENT-LVL III: ICD-10-PCS | Mod: PBBFAC,,, | Performed by: OBSTETRICS & GYNECOLOGY

## 2023-08-17 PROCEDURE — 59025 PR FETAL 2N-STRESS TEST: ICD-10-PCS | Mod: 26,S$PBB,, | Performed by: OBSTETRICS & GYNECOLOGY

## 2023-08-17 PROCEDURE — 59025 FETAL NON-STRESS TEST: CPT | Mod: PBBFAC | Performed by: OBSTETRICS & GYNECOLOGY

## 2023-08-17 PROCEDURE — 99999 PR PBB SHADOW E&M-EST. PATIENT-LVL III: CPT | Mod: PBBFAC,,, | Performed by: OBSTETRICS & GYNECOLOGY

## 2023-08-17 PROCEDURE — 99213 PR OFFICE/OUTPT VISIT, EST, LEVL III, 20-29 MIN: ICD-10-PCS | Mod: TH,S$PBB,25, | Performed by: OBSTETRICS & GYNECOLOGY

## 2023-08-17 PROCEDURE — 99213 OFFICE O/P EST LOW 20 MIN: CPT | Mod: PBBFAC,TH,25 | Performed by: OBSTETRICS & GYNECOLOGY

## 2023-08-17 PROCEDURE — 99213 OFFICE O/P EST LOW 20 MIN: CPT | Mod: TH,S$PBB,25, | Performed by: OBSTETRICS & GYNECOLOGY

## 2023-08-17 RX ORDER — CARBOPROST TROMETHAMINE 250 UG/ML
250 INJECTION, SOLUTION INTRAMUSCULAR
Status: CANCELLED | OUTPATIENT
Start: 2023-08-17

## 2023-08-17 RX ORDER — SIMETHICONE 80 MG
1 TABLET,CHEWABLE ORAL 4 TIMES DAILY PRN
Status: CANCELLED | OUTPATIENT
Start: 2023-08-17

## 2023-08-17 RX ORDER — MISOPROSTOL 100 MCG
50 TABLET ORAL EVERY 4 HOURS PRN
Status: CANCELLED | OUTPATIENT
Start: 2023-08-23

## 2023-08-17 RX ORDER — ONDANSETRON 4 MG/1
8 TABLET, ORALLY DISINTEGRATING ORAL EVERY 8 HOURS PRN
Status: CANCELLED | OUTPATIENT
Start: 2023-08-17

## 2023-08-17 RX ORDER — SODIUM CHLORIDE, SODIUM LACTATE, POTASSIUM CHLORIDE, CALCIUM CHLORIDE 600; 310; 30; 20 MG/100ML; MG/100ML; MG/100ML; MG/100ML
INJECTION, SOLUTION INTRAVENOUS CONTINUOUS
Status: CANCELLED | OUTPATIENT
Start: 2023-08-17

## 2023-08-17 RX ORDER — DIPHENOXYLATE HYDROCHLORIDE AND ATROPINE SULFATE 2.5; .025 MG/1; MG/1
1 TABLET ORAL 4 TIMES DAILY PRN
Status: CANCELLED | OUTPATIENT
Start: 2023-08-17

## 2023-08-17 RX ORDER — OXYTOCIN/RINGER'S LACTATE 30/500 ML
0-30 PLASTIC BAG, INJECTION (ML) INTRAVENOUS CONTINUOUS
Status: CANCELLED | OUTPATIENT
Start: 2023-08-24

## 2023-08-17 RX ORDER — METHYLERGONOVINE MALEATE 0.2 MG/ML
200 INJECTION INTRAVENOUS
Status: CANCELLED | OUTPATIENT
Start: 2023-08-17

## 2023-08-17 RX ORDER — MISOPROSTOL 100 UG/1
800 TABLET ORAL ONCE AS NEEDED
Status: CANCELLED | OUTPATIENT
Start: 2023-08-17

## 2023-08-17 RX ORDER — OXYTOCIN/RINGER'S LACTATE 30/500 ML
334 PLASTIC BAG, INJECTION (ML) INTRAVENOUS ONCE AS NEEDED
Status: CANCELLED | OUTPATIENT
Start: 2023-08-17 | End: 2035-01-13

## 2023-08-17 RX ORDER — CALCIUM CARBONATE 200(500)MG
500 TABLET,CHEWABLE ORAL 3 TIMES DAILY PRN
Status: CANCELLED | OUTPATIENT
Start: 2023-08-17

## 2023-08-17 RX ORDER — MUPIROCIN 20 MG/G
OINTMENT TOPICAL
Status: CANCELLED | OUTPATIENT
Start: 2023-08-17

## 2023-08-17 RX ORDER — TERBUTALINE SULFATE 1 MG/ML
0.25 INJECTION SUBCUTANEOUS
Status: CANCELLED | OUTPATIENT
Start: 2023-08-17

## 2023-08-17 RX ORDER — MISOPROSTOL 100 UG/1
800 TABLET ORAL
Status: CANCELLED | OUTPATIENT
Start: 2023-08-17

## 2023-08-17 RX ORDER — PROCHLORPERAZINE EDISYLATE 5 MG/ML
5 INJECTION INTRAMUSCULAR; INTRAVENOUS EVERY 6 HOURS PRN
Status: CANCELLED | OUTPATIENT
Start: 2023-08-17

## 2023-08-17 RX ORDER — MISOPROSTOL 100 UG/1
800 TABLET ORAL ONCE AS NEEDED
Status: CANCELLED | OUTPATIENT
Start: 2023-08-17 | End: 2035-01-13

## 2023-08-17 RX ORDER — LIDOCAINE HYDROCHLORIDE 10 MG/ML
10 INJECTION INFILTRATION; PERINEURAL ONCE AS NEEDED
Status: CANCELLED | OUTPATIENT
Start: 2023-08-17 | End: 2035-01-13

## 2023-08-17 RX ORDER — OXYTOCIN/RINGER'S LACTATE 30/500 ML
95 PLASTIC BAG, INJECTION (ML) INTRAVENOUS ONCE
Status: CANCELLED | OUTPATIENT
Start: 2023-08-17 | End: 2023-08-17

## 2023-08-17 RX ORDER — OXYTOCIN/RINGER'S LACTATE 30/500 ML
334 PLASTIC BAG, INJECTION (ML) INTRAVENOUS ONCE
Status: CANCELLED | OUTPATIENT
Start: 2023-08-17 | End: 2023-08-17

## 2023-08-17 RX ORDER — OXYTOCIN 10 [USP'U]/ML
10 INJECTION, SOLUTION INTRAMUSCULAR; INTRAVENOUS ONCE AS NEEDED
Status: CANCELLED | OUTPATIENT
Start: 2023-08-17 | End: 2035-01-13

## 2023-08-17 RX ORDER — DIPHENOXYLATE HYDROCHLORIDE AND ATROPINE SULFATE 2.5; .025 MG/1; MG/1
2 TABLET ORAL EVERY 6 HOURS PRN
Status: CANCELLED | OUTPATIENT
Start: 2023-08-17

## 2023-08-17 RX ORDER — OXYTOCIN/RINGER'S LACTATE 30/500 ML
95 PLASTIC BAG, INJECTION (ML) INTRAVENOUS ONCE AS NEEDED
Status: CANCELLED | OUTPATIENT
Start: 2023-08-17 | End: 2035-01-13

## 2023-08-17 NOTE — PROGRESS NOTES
38 y.o.  at 37w2d here for OB visit.    Pregnancy complicated by:   AMA  cfDNA negative XY  Maternal obesity   Anemia, iron deficiency    HTN, concern for gestational hypertension at ~33 wks    Pt c/o crampy pelvic pressure.  Abd soft/NT.  Soft uterine tone.  Cervix FT/30%/-3.  Vertex on VSCAN.    Otherwise, pt has no major complaints today.  Reports active fetus.  Denies vaginal bleeding, leakage of fluid, or contractions.    Pt reports normal range BP at home.  Pt denies headaches, vision changes, epigastric pain, or acute swelling.  Implications of gHTN on pregnancy discussed.  Continue twice wkly NST until delivery.  Timing of delivery discussed.    Indications for NST:  Gestational hypertension, AMA, maternal obesity.  NST: Baseline 130's, moderate variability, positive acceleration, no decelerations. [place]: No contraction.  Monitored ~20 mins.  MVP: ~6 cm  Continue twice wkly NST, wkly MVP until delivery.    GBS negative.    Pt is requesting induction of labor 23.  Risks, benefits, and alternatives to IOL reviewed.  Pt is resolute in her decision to proceed with IOL.  Pre-admission instructions reviewed pt.    Discussed implications of obesity on pregnancy.    Weight gain goals discussed.    Encourage healthy lifestyle modifications.      Encourage fergon for anemia.  Encourage daily low dose ASA for prevention of preE.  Encourage participation Connective MOM program.  Encourage Covid and flu vaccine.    Labor/preE precautions.  Kick counts.  Go to L&D for any concerns.  Return twice wkly for NST or sooner prn until delivery.  All questions answered, voiced understanding.      Jonel Pollard MD

## 2023-08-23 ENCOUNTER — HOSPITAL ENCOUNTER (INPATIENT)
Facility: HOSPITAL | Age: 38
LOS: 2 days | Discharge: HOME | End: 2023-08-25
Attending: OBSTETRICS & GYNECOLOGY | Admitting: OBSTETRICS & GYNECOLOGY
Payer: MEDICAID

## 2023-08-23 DIAGNOSIS — Z3A.37 37 WEEKS GESTATION OF PREGNANCY: ICD-10-CM

## 2023-08-23 DIAGNOSIS — O09.522 MULTIGRAVIDA OF ADVANCED MATERNAL AGE IN SECOND TRIMESTER: ICD-10-CM

## 2023-08-23 DIAGNOSIS — O13.3 GESTATIONAL HYPERTENSION, THIRD TRIMESTER: ICD-10-CM

## 2023-08-23 DIAGNOSIS — O99.210 OBESITY IN PREGNANCY, ANTEPARTUM, UNSPECIFIED TRIMESTER: ICD-10-CM

## 2023-08-23 LAB
ABO + RH BLD: NORMAL
BASOPHILS # BLD AUTO: 0.02 K/UL (ref 0–0.2)
BASOPHILS NFR BLD: 0.3 % (ref 0–1.9)
BLD GP AB SCN CELLS X3 SERPL QL: NORMAL
DIFFERENTIAL METHOD: ABNORMAL
EOSINOPHIL # BLD AUTO: 0 K/UL (ref 0–0.5)
EOSINOPHIL NFR BLD: 0.5 % (ref 0–8)
ERYTHROCYTE [DISTWIDTH] IN BLOOD BY AUTOMATED COUNT: 15.6 % (ref 11.5–14.5)
HCT VFR BLD AUTO: 30.5 % (ref 37–48.5)
HGB BLD-MCNC: 9.7 G/DL (ref 12–16)
IMM GRANULOCYTES # BLD AUTO: 0.04 K/UL (ref 0–0.04)
IMM GRANULOCYTES NFR BLD AUTO: 0.5 % (ref 0–0.5)
LYMPHOCYTES # BLD AUTO: 2 K/UL (ref 1–4.8)
LYMPHOCYTES NFR BLD: 28 % (ref 18–48)
MCH RBC QN AUTO: 24.3 PG (ref 27–31)
MCHC RBC AUTO-ENTMCNC: 31.8 G/DL (ref 32–36)
MCV RBC AUTO: 76 FL (ref 82–98)
MONOCYTES # BLD AUTO: 0.5 K/UL (ref 0.3–1)
MONOCYTES NFR BLD: 7.4 % (ref 4–15)
NEUTROPHILS # BLD AUTO: 4.6 K/UL (ref 1.8–7.7)
NEUTROPHILS NFR BLD: 63.3 % (ref 38–73)
NRBC BLD-RTO: 0 /100 WBC
PLATELET # BLD AUTO: 388 K/UL (ref 150–450)
PMV BLD AUTO: 10.3 FL (ref 9.2–12.9)
RBC # BLD AUTO: 4 M/UL (ref 4–5.4)
WBC # BLD AUTO: 7.28 K/UL (ref 3.9–12.7)

## 2023-08-23 PROCEDURE — 72100002 HC LABOR CARE, 1ST 8 HOURS

## 2023-08-23 PROCEDURE — 59025 FETAL NON-STRESS TEST: CPT

## 2023-08-23 PROCEDURE — 86850 RBC ANTIBODY SCREEN: CPT | Performed by: OBSTETRICS & GYNECOLOGY

## 2023-08-23 PROCEDURE — 86592 SYPHILIS TEST NON-TREP QUAL: CPT | Performed by: OBSTETRICS & GYNECOLOGY

## 2023-08-23 PROCEDURE — 11000001 HC ACUTE MED/SURG PRIVATE ROOM

## 2023-08-23 PROCEDURE — 85025 COMPLETE CBC W/AUTO DIFF WBC: CPT | Performed by: OBSTETRICS & GYNECOLOGY

## 2023-08-23 PROCEDURE — 63600175 PHARM REV CODE 636 W HCPCS: Performed by: OBSTETRICS & GYNECOLOGY

## 2023-08-23 PROCEDURE — 25000003 PHARM REV CODE 250: Performed by: OBSTETRICS & GYNECOLOGY

## 2023-08-23 RX ORDER — DIPHENOXYLATE HYDROCHLORIDE AND ATROPINE SULFATE 2.5; .025 MG/1; MG/1
2 TABLET ORAL EVERY 6 HOURS PRN
Status: DISCONTINUED | OUTPATIENT
Start: 2023-08-23 | End: 2023-08-24

## 2023-08-23 RX ORDER — OXYTOCIN/RINGER'S LACTATE 30/500 ML
95 PLASTIC BAG, INJECTION (ML) INTRAVENOUS ONCE AS NEEDED
Status: DISCONTINUED | OUTPATIENT
Start: 2023-08-23 | End: 2023-08-24

## 2023-08-23 RX ORDER — SODIUM CHLORIDE, SODIUM LACTATE, POTASSIUM CHLORIDE, CALCIUM CHLORIDE 600; 310; 30; 20 MG/100ML; MG/100ML; MG/100ML; MG/100ML
INJECTION, SOLUTION INTRAVENOUS CONTINUOUS
Status: DISCONTINUED | OUTPATIENT
Start: 2023-08-23 | End: 2023-08-24

## 2023-08-23 RX ORDER — ONDANSETRON 8 MG/1
8 TABLET, ORALLY DISINTEGRATING ORAL EVERY 8 HOURS PRN
Status: DISCONTINUED | OUTPATIENT
Start: 2023-08-23 | End: 2023-08-24

## 2023-08-23 RX ORDER — DIPHENOXYLATE HYDROCHLORIDE AND ATROPINE SULFATE 2.5; .025 MG/1; MG/1
1 TABLET ORAL 4 TIMES DAILY PRN
Status: DISCONTINUED | OUTPATIENT
Start: 2023-08-23 | End: 2023-08-24

## 2023-08-23 RX ORDER — OXYTOCIN/RINGER'S LACTATE 30/500 ML
95 PLASTIC BAG, INJECTION (ML) INTRAVENOUS ONCE
Status: DISCONTINUED | OUTPATIENT
Start: 2023-08-23 | End: 2023-08-24

## 2023-08-23 RX ORDER — PROCHLORPERAZINE EDISYLATE 5 MG/ML
5 INJECTION INTRAMUSCULAR; INTRAVENOUS EVERY 6 HOURS PRN
Status: DISCONTINUED | OUTPATIENT
Start: 2023-08-23 | End: 2023-08-24

## 2023-08-23 RX ORDER — METHYLERGONOVINE MALEATE 0.2 MG/ML
200 INJECTION INTRAVENOUS
Status: DISCONTINUED | OUTPATIENT
Start: 2023-08-23 | End: 2023-08-24

## 2023-08-23 RX ORDER — MISOPROSTOL 200 UG/1
800 TABLET ORAL
Status: DISCONTINUED | OUTPATIENT
Start: 2023-08-23 | End: 2023-08-24

## 2023-08-23 RX ORDER — CARBOPROST TROMETHAMINE 250 UG/ML
250 INJECTION, SOLUTION INTRAMUSCULAR
Status: DISCONTINUED | OUTPATIENT
Start: 2023-08-23 | End: 2023-08-24

## 2023-08-23 RX ORDER — CALCIUM CARBONATE 200(500)MG
500 TABLET,CHEWABLE ORAL 3 TIMES DAILY PRN
Status: DISCONTINUED | OUTPATIENT
Start: 2023-08-23 | End: 2023-08-24

## 2023-08-23 RX ORDER — SIMETHICONE 80 MG
1 TABLET,CHEWABLE ORAL 4 TIMES DAILY PRN
Status: DISCONTINUED | OUTPATIENT
Start: 2023-08-23 | End: 2023-08-24

## 2023-08-23 RX ORDER — TRANEXAMIC ACID 10 MG/ML
1000 INJECTION, SOLUTION INTRAVENOUS ONCE AS NEEDED
Status: DISCONTINUED | OUTPATIENT
Start: 2023-08-23 | End: 2023-08-24

## 2023-08-23 RX ORDER — LIDOCAINE HYDROCHLORIDE 10 MG/ML
10 INJECTION INFILTRATION; PERINEURAL ONCE AS NEEDED
Status: DISCONTINUED | OUTPATIENT
Start: 2023-08-23 | End: 2023-08-24

## 2023-08-23 RX ORDER — TERBUTALINE SULFATE 1 MG/ML
0.25 INJECTION SUBCUTANEOUS
Status: DISCONTINUED | OUTPATIENT
Start: 2023-08-23 | End: 2023-08-24

## 2023-08-23 RX ORDER — MISOPROSTOL 200 UG/1
800 TABLET ORAL ONCE AS NEEDED
Status: DISCONTINUED | OUTPATIENT
Start: 2023-08-23 | End: 2023-08-24

## 2023-08-23 RX ORDER — OXYTOCIN/RINGER'S LACTATE 30/500 ML
334 PLASTIC BAG, INJECTION (ML) INTRAVENOUS ONCE
Status: DISCONTINUED | OUTPATIENT
Start: 2023-08-23 | End: 2023-08-24

## 2023-08-23 RX ORDER — OXYTOCIN/RINGER'S LACTATE 30/500 ML
0-30 PLASTIC BAG, INJECTION (ML) INTRAVENOUS CONTINUOUS
Status: DISCONTINUED | OUTPATIENT
Start: 2023-08-24 | End: 2023-08-24

## 2023-08-23 RX ORDER — OXYTOCIN 10 [USP'U]/ML
10 INJECTION, SOLUTION INTRAMUSCULAR; INTRAVENOUS ONCE AS NEEDED
Status: DISCONTINUED | OUTPATIENT
Start: 2023-08-23 | End: 2023-08-24

## 2023-08-23 RX ORDER — MISOPROSTOL 100 MCG
50 TABLET ORAL EVERY 4 HOURS PRN
Status: COMPLETED | OUTPATIENT
Start: 2023-08-23 | End: 2023-08-24

## 2023-08-23 RX ORDER — MUPIROCIN 20 MG/G
OINTMENT TOPICAL
Status: DISCONTINUED | OUTPATIENT
Start: 2023-08-23 | End: 2023-08-24

## 2023-08-23 RX ORDER — OXYTOCIN/RINGER'S LACTATE 30/500 ML
334 PLASTIC BAG, INJECTION (ML) INTRAVENOUS ONCE AS NEEDED
Status: DISCONTINUED | OUTPATIENT
Start: 2023-08-23 | End: 2023-08-24

## 2023-08-23 RX ADMIN — MISOPROSTOL 50 MCG: 100 TABLET ORAL at 04:08

## 2023-08-23 RX ADMIN — SODIUM CHLORIDE, POTASSIUM CHLORIDE, SODIUM LACTATE AND CALCIUM CHLORIDE: 600; 310; 30; 20 INJECTION, SOLUTION INTRAVENOUS at 11:08

## 2023-08-23 RX ADMIN — MISOPROSTOL 50 MCG: 100 TABLET ORAL at 08:08

## 2023-08-23 RX ADMIN — SODIUM CHLORIDE, POTASSIUM CHLORIDE, SODIUM LACTATE AND CALCIUM CHLORIDE: 600; 310; 30; 20 INJECTION, SOLUTION INTRAVENOUS at 03:08

## 2023-08-23 NOTE — H&P
[facility]    Obstetrics & Gynecology  History & Physical      Patient Name:  Ghanshyam Harrison  MRN:  [id number]  Attending Physician:  Jonel Pollard MD    Date:  2023    Chief Complaint:  Induction of labor    History of Present Illness:      Ghanshyam Harrison is a 38 y.o.  at 38w1d at time of anticipated admission on 2023 here for pre-admit H&P for induction of labor secondary to gestational hypertension with maternal co-morbidities.  Pt has no major complaints.  Pt reports active fetal movements and denies contractions, vaginal bleeding or leakage of fluid.      Pregnancy complicated by:   AMA  cfDNA negative XY  Maternal obesity   Anemia, iron deficiency    Gestational hypertension at ~33 wks    OB History    Para Term  AB Living   2 1 1 0 1 1   SAB IAB Ectopic Multiple Live Births   1 0 0 0 1      # Outcome Date GA Lbr Raul/2nd Weight Sex Delivery Anes PTL Lv   2 Term 18 37w4d  2.835 kg (6 lb 4 oz) F Vag-Spont EPI N GINETTE      Name: BERNA HARRISON, FRANCISCO KAY      Apgar1: 9  Apgar5: 9   1 SAB              PMH - obesity  PSH - cholecystectomy, suction D&C  SH - denies tobacco, etoh, drugs  FH - denies congenital defects, aneuploidy   GYN Hx - denies STI, abn pap  MEDS - PNV, fergon, ASA    Review of patient's allergies indicates:   Allergen Reactions    Ace inhibitors Anaphylaxis    Amoxicillin Anaphylaxis    Lisinopril Anaphylaxis    Penicillins Anaphylaxis    Sulfamethoxazole-trimethoprim Anaphylaxis    Tramadol Itching     Physical Exam:     /62   Wt 111.9 kg (246 lb 11.1 oz)   LMP 2022   BMI 39.82 kg/m²      GENERAL:  No acute distress.  Alert and oriented to person, place and time.  HEENT:  Normocephalic, atraumatic, anicteric, moist mucus membranes.  Neck supple without masses.  LUNGS:  Clear to auscultation bilaterally.  HEART:  Regular rate & rhythm with physiologic heart sounds.  ABDOMEN:  Soft, non tender without any guarding,  rigidity or rebound. Normoactive bowel sounds.  PELVIC:  Normal female external genitalia without gross lesions, rashes or excoriations. No gross vaginal or cervical lesions.  Adequate pelvis.  Cervix: FT cm dilated/30% effaced/-3 station.  EXTREMITIES:  Symmetric without cramping, claudication. Trace edema LE. +2 distal pulses.  Full range of motion.  SKIN:  No rashes, good turgor & capillary refill.  NEUROLOGIC:  Grossly intact bilaterally. +2 DTR, symmetric.  PSYCH:  Mood & affect appropriate.         Presentation: cephalic     EFW ~7 lb by Leopold's     Assessment:     38 y.o.  at 38w1d for induction of labor secondary to gestational hypertension  Gestational hypertension at ~33 wks  AMA, cfDNA negative XY  Maternal obesity   Anemia, iron deficiency      Plan:    Admit to L&D for induction of labor    Pt was informed of the risks, benefits, alternatives and possible complications to induction of labor (with cervidil, cytotec, pitocin, and/or saleh bulb), vaginal delivery, operative vaginal delivery,  section, blood transfusion, and the possibility of failed labor induction resulting in a  section due to maternal or fetal indications.  All questions were answered to her satisfaction.  Pt  voiced understanding and acceptance of these risks, and wishes to proceed with induction of labor.  Informed consents were obtained for delivery and blood transfusions.    Routine admit labs    Continuous fetal monitoring    Consult anesthesia, epidural prn    Monitor BP closely, treat as clinically indicated    Consider mag sulfate for seizure prophylaxis if worsening HTN, or signs/sxs of preeclampsia    Fe supplementation when ready      Jonel Pollard MD

## 2023-08-23 NOTE — NURSING
Patient ambulated to unit from registration for scheduled IOL. Plan of care discussed. Instructed on Hibiclens pre-procedure shower.

## 2023-08-23 NOTE — H&P (VIEW-ONLY)
[facility]    Obstetrics & Gynecology  History & Physical      Patient Name:  Ghanshyam Harrison  MRN:  [id number]  Attending Physician:  Jonel Pollard MD    Date:  2023    Chief Complaint:  Induction of labor    History of Present Illness:      Ghanshyam Harrison is a 38 y.o.  at 38w1d at time of anticipated admission on 2023 here for pre-admit H&P for induction of labor secondary to gestational hypertension with maternal co-morbidities.  Pt has no major complaints.  Pt reports active fetal movements and denies contractions, vaginal bleeding or leakage of fluid.      Pregnancy complicated by:   AMA  cfDNA negative XY  Maternal obesity   Anemia, iron deficiency    Gestational hypertension at ~33 wks    OB History    Para Term  AB Living   2 1 1 0 1 1   SAB IAB Ectopic Multiple Live Births   1 0 0 0 1      # Outcome Date GA Lbr Raul/2nd Weight Sex Delivery Anes PTL Lv   2 Term 18 37w4d  2.835 kg (6 lb 4 oz) F Vag-Spont EPI N GINETTE      Name: BERNA HARRISON, FRANCISCO KAY      Apgar1: 9  Apgar5: 9   1 SAB              PMH - obesity  PSH - cholecystectomy, suction D&C  SH - denies tobacco, etoh, drugs  FH - denies congenital defects, aneuploidy   GYN Hx - denies STI, abn pap  MEDS - PNV, fergon, ASA    Review of patient's allergies indicates:   Allergen Reactions    Ace inhibitors Anaphylaxis    Amoxicillin Anaphylaxis    Lisinopril Anaphylaxis    Penicillins Anaphylaxis    Sulfamethoxazole-trimethoprim Anaphylaxis    Tramadol Itching     Physical Exam:     /62   Wt 111.9 kg (246 lb 11.1 oz)   LMP 2022   BMI 39.82 kg/m²      GENERAL:  No acute distress.  Alert and oriented to person, place and time.  HEENT:  Normocephalic, atraumatic, anicteric, moist mucus membranes.  Neck supple without masses.  LUNGS:  Clear to auscultation bilaterally.  HEART:  Regular rate & rhythm with physiologic heart sounds.  ABDOMEN:  Soft, non tender without any guarding,  rigidity or rebound. Normoactive bowel sounds.  PELVIC:  Normal female external genitalia without gross lesions, rashes or excoriations. No gross vaginal or cervical lesions.  Adequate pelvis.  Cervix: FT cm dilated/30% effaced/-3 station.  EXTREMITIES:  Symmetric without cramping, claudication. Trace edema LE. +2 distal pulses.  Full range of motion.  SKIN:  No rashes, good turgor & capillary refill.  NEUROLOGIC:  Grossly intact bilaterally. +2 DTR, symmetric.  PSYCH:  Mood & affect appropriate.         Presentation: cephalic     EFW ~7 lb by Leopold's     Assessment:     38 y.o.  at 38w1d for induction of labor secondary to gestational hypertension  Gestational hypertension at ~33 wks  AMA, cfDNA negative XY  Maternal obesity   Anemia, iron deficiency      Plan:    Admit to L&D for induction of labor    Pt was informed of the risks, benefits, alternatives and possible complications to induction of labor (with cervidil, cytotec, pitocin, and/or saleh bulb), vaginal delivery, operative vaginal delivery,  section, blood transfusion, and the possibility of failed labor induction resulting in a  section due to maternal or fetal indications.  All questions were answered to her satisfaction.  Pt  voiced understanding and acceptance of these risks, and wishes to proceed with induction of labor.  Informed consents were obtained for delivery and blood transfusions.    Routine admit labs    Continuous fetal monitoring    Consult anesthesia, epidural prn    Monitor BP closely, treat as clinically indicated    Consider mag sulfate for seizure prophylaxis if worsening HTN, or signs/sxs of preeclampsia    Fe supplementation when ready      Jonel Pollard MD

## 2023-08-24 ENCOUNTER — ANESTHESIA (OUTPATIENT)
Dept: OBSTETRICS AND GYNECOLOGY | Facility: HOSPITAL | Age: 38
End: 2023-08-24
Payer: MEDICAID

## 2023-08-24 ENCOUNTER — ANESTHESIA EVENT (OUTPATIENT)
Dept: OBSTETRICS AND GYNECOLOGY | Facility: HOSPITAL | Age: 38
End: 2023-08-24
Payer: MEDICAID

## 2023-08-24 PROBLEM — O13.3 GESTATIONAL HYPERTENSION, THIRD TRIMESTER: Status: ACTIVE | Noted: 2023-08-24

## 2023-08-24 PROCEDURE — 59409 PRA ETRICAL CARE,VAG DELIV ONLY: ICD-10-PCS | Mod: AA,,, | Performed by: STUDENT IN AN ORGANIZED HEALTH CARE EDUCATION/TRAINING PROGRAM

## 2023-08-24 PROCEDURE — 27200710 HC EPIDURAL INFUSION PUMP SET: Performed by: STUDENT IN AN ORGANIZED HEALTH CARE EDUCATION/TRAINING PROGRAM

## 2023-08-24 PROCEDURE — 72200005 HC VAGINAL DELIVERY LEVEL II

## 2023-08-24 PROCEDURE — 59409 OBSTETRICAL CARE: CPT | Mod: AT,,, | Performed by: OBSTETRICS & GYNECOLOGY

## 2023-08-24 PROCEDURE — 62326 NJX INTERLAMINAR LMBR/SAC: CPT | Performed by: STUDENT IN AN ORGANIZED HEALTH CARE EDUCATION/TRAINING PROGRAM

## 2023-08-24 PROCEDURE — 63600175 PHARM REV CODE 636 W HCPCS: Performed by: OBSTETRICS & GYNECOLOGY

## 2023-08-24 PROCEDURE — 11000001 HC ACUTE MED/SURG PRIVATE ROOM

## 2023-08-24 PROCEDURE — 72100003 HC LABOR CARE, EA. ADDL. 8 HRS

## 2023-08-24 PROCEDURE — 59409 OBSTETRICAL CARE: CPT | Mod: AA,,, | Performed by: STUDENT IN AN ORGANIZED HEALTH CARE EDUCATION/TRAINING PROGRAM

## 2023-08-24 PROCEDURE — 59409 PR OBSTETRICAL CARE,VAG DELIV ONLY: ICD-10-PCS | Mod: AT,,, | Performed by: OBSTETRICS & GYNECOLOGY

## 2023-08-24 PROCEDURE — 51702 INSERT TEMP BLADDER CATH: CPT

## 2023-08-24 PROCEDURE — 25000003 PHARM REV CODE 250: Performed by: OBSTETRICS & GYNECOLOGY

## 2023-08-24 PROCEDURE — C1751 CATH, INF, PER/CENT/MIDLINE: HCPCS | Performed by: STUDENT IN AN ORGANIZED HEALTH CARE EDUCATION/TRAINING PROGRAM

## 2023-08-24 PROCEDURE — 25000003 PHARM REV CODE 250: Performed by: STUDENT IN AN ORGANIZED HEALTH CARE EDUCATION/TRAINING PROGRAM

## 2023-08-24 RX ORDER — DOCUSATE SODIUM 100 MG/1
200 CAPSULE, LIQUID FILLED ORAL 2 TIMES DAILY PRN
Status: DISCONTINUED | OUTPATIENT
Start: 2023-08-24 | End: 2023-08-25 | Stop reason: HOSPADM

## 2023-08-24 RX ORDER — FAMOTIDINE 10 MG/ML
20 INJECTION INTRAVENOUS ONCE
Status: DISCONTINUED | OUTPATIENT
Start: 2023-08-24 | End: 2023-08-25 | Stop reason: HOSPADM

## 2023-08-24 RX ORDER — IBUPROFEN 600 MG/1
600 TABLET ORAL EVERY 6 HOURS PRN
Status: DISCONTINUED | OUTPATIENT
Start: 2023-08-24 | End: 2023-08-25 | Stop reason: HOSPADM

## 2023-08-24 RX ORDER — SIMETHICONE 80 MG
1 TABLET,CHEWABLE ORAL EVERY 6 HOURS PRN
Status: DISCONTINUED | OUTPATIENT
Start: 2023-08-24 | End: 2023-08-25 | Stop reason: HOSPADM

## 2023-08-24 RX ORDER — HYDROCORTISONE 25 MG/G
CREAM TOPICAL 3 TIMES DAILY PRN
Status: DISCONTINUED | OUTPATIENT
Start: 2023-08-24 | End: 2023-08-25 | Stop reason: HOSPADM

## 2023-08-24 RX ORDER — METHYLERGONOVINE MALEATE 0.2 MG/ML
200 INJECTION INTRAVENOUS
Status: DISCONTINUED | OUTPATIENT
Start: 2023-08-24 | End: 2023-08-25 | Stop reason: HOSPADM

## 2023-08-24 RX ORDER — DIPHENHYDRAMINE HYDROCHLORIDE 50 MG/ML
25 INJECTION INTRAMUSCULAR; INTRAVENOUS EVERY 4 HOURS PRN
Status: DISCONTINUED | OUTPATIENT
Start: 2023-08-24 | End: 2023-08-25 | Stop reason: HOSPADM

## 2023-08-24 RX ORDER — OXYTOCIN/RINGER'S LACTATE 30/500 ML
95 PLASTIC BAG, INJECTION (ML) INTRAVENOUS ONCE AS NEEDED
Status: DISCONTINUED | OUTPATIENT
Start: 2023-08-24 | End: 2023-08-25 | Stop reason: HOSPADM

## 2023-08-24 RX ORDER — DIPHENHYDRAMINE HCL 25 MG
25 CAPSULE ORAL EVERY 4 HOURS PRN
Status: DISCONTINUED | OUTPATIENT
Start: 2023-08-24 | End: 2023-08-25 | Stop reason: HOSPADM

## 2023-08-24 RX ORDER — DIPHENOXYLATE HYDROCHLORIDE AND ATROPINE SULFATE 2.5; .025 MG/1; MG/1
2 TABLET ORAL EVERY 6 HOURS PRN
Status: DISCONTINUED | OUTPATIENT
Start: 2023-08-24 | End: 2023-08-25 | Stop reason: HOSPADM

## 2023-08-24 RX ORDER — OXYTOCIN 10 [USP'U]/ML
10 INJECTION, SOLUTION INTRAMUSCULAR; INTRAVENOUS ONCE AS NEEDED
Status: DISCONTINUED | OUTPATIENT
Start: 2023-08-24 | End: 2023-08-25 | Stop reason: HOSPADM

## 2023-08-24 RX ORDER — SODIUM CHLORIDE 0.9 % (FLUSH) 0.9 %
10 SYRINGE (ML) INJECTION
Status: DISCONTINUED | OUTPATIENT
Start: 2023-08-24 | End: 2023-08-25 | Stop reason: HOSPADM

## 2023-08-24 RX ORDER — OXYTOCIN/RINGER'S LACTATE 30/500 ML
95 PLASTIC BAG, INJECTION (ML) INTRAVENOUS ONCE
Status: DISCONTINUED | OUTPATIENT
Start: 2023-08-24 | End: 2023-08-25 | Stop reason: HOSPADM

## 2023-08-24 RX ORDER — ONDANSETRON 8 MG/1
8 TABLET, ORALLY DISINTEGRATING ORAL EVERY 8 HOURS PRN
Status: DISCONTINUED | OUTPATIENT
Start: 2023-08-24 | End: 2023-08-25 | Stop reason: HOSPADM

## 2023-08-24 RX ORDER — DIPHENHYDRAMINE HCL 25 MG
50 CAPSULE ORAL ONCE
Status: COMPLETED | OUTPATIENT
Start: 2023-08-24 | End: 2023-08-24

## 2023-08-24 RX ORDER — FENTANYL/BUPIVACAINE/NS/PF 2MCG/ML-.1
PLASTIC BAG, INJECTION (ML) INJECTION CONTINUOUS PRN
Status: DISCONTINUED | OUTPATIENT
Start: 2023-08-24 | End: 2023-08-24

## 2023-08-24 RX ORDER — OXYTOCIN/RINGER'S LACTATE 30/500 ML
334 PLASTIC BAG, INJECTION (ML) INTRAVENOUS ONCE AS NEEDED
Status: DISCONTINUED | OUTPATIENT
Start: 2023-08-24 | End: 2023-08-25 | Stop reason: HOSPADM

## 2023-08-24 RX ORDER — PROCHLORPERAZINE EDISYLATE 5 MG/ML
5 INJECTION INTRAMUSCULAR; INTRAVENOUS EVERY 6 HOURS PRN
Status: DISCONTINUED | OUTPATIENT
Start: 2023-08-24 | End: 2023-08-25 | Stop reason: HOSPADM

## 2023-08-24 RX ORDER — MISOPROSTOL 200 UG/1
800 TABLET ORAL ONCE AS NEEDED
Status: DISCONTINUED | OUTPATIENT
Start: 2023-08-24 | End: 2023-08-25 | Stop reason: HOSPADM

## 2023-08-24 RX ORDER — SODIUM CITRATE AND CITRIC ACID MONOHYDRATE 334; 500 MG/5ML; MG/5ML
30 SOLUTION ORAL ONCE
Status: DISCONTINUED | OUTPATIENT
Start: 2023-08-24 | End: 2023-08-25 | Stop reason: HOSPADM

## 2023-08-24 RX ORDER — CARBOPROST TROMETHAMINE 250 UG/ML
250 INJECTION, SOLUTION INTRAMUSCULAR
Status: DISCONTINUED | OUTPATIENT
Start: 2023-08-24 | End: 2023-08-25 | Stop reason: HOSPADM

## 2023-08-24 RX ORDER — TRANEXAMIC ACID 10 MG/ML
1000 INJECTION, SOLUTION INTRAVENOUS ONCE AS NEEDED
Status: DISCONTINUED | OUTPATIENT
Start: 2023-08-24 | End: 2023-08-25 | Stop reason: HOSPADM

## 2023-08-24 RX ADMIN — IBUPROFEN 600 MG: 600 TABLET ORAL at 12:08

## 2023-08-24 RX ADMIN — SODIUM CHLORIDE, POTASSIUM CHLORIDE, SODIUM LACTATE AND CALCIUM CHLORIDE: 600; 310; 30; 20 INJECTION, SOLUTION INTRAVENOUS at 04:08

## 2023-08-24 RX ADMIN — MISOPROSTOL 50 MCG: 100 TABLET ORAL at 01:08

## 2023-08-24 RX ADMIN — SODIUM CHLORIDE, POTASSIUM CHLORIDE, SODIUM LACTATE AND CALCIUM CHLORIDE 1000 ML: 600; 310; 30; 20 INJECTION, SOLUTION INTRAVENOUS at 03:08

## 2023-08-24 RX ADMIN — Medication 10 ML/HR: at 03:08

## 2023-08-24 RX ADMIN — IBUPROFEN 600 MG: 600 TABLET ORAL at 06:08

## 2023-08-24 RX ADMIN — OXYTOCIN 2 MILLI-UNITS/MIN: 10 INJECTION, SOLUTION INTRAMUSCULAR; INTRAVENOUS at 04:08

## 2023-08-24 RX ADMIN — DIPHENHYDRAMINE HYDROCHLORIDE 50 MG: 25 CAPSULE ORAL at 08:08

## 2023-08-24 RX ADMIN — IBUPROFEN 600 MG: 600 TABLET ORAL at 11:08

## 2023-08-24 NOTE — LACTATION NOTE
08/24/23 0735   Maternal Assessment   Breast Density Bilateral:;soft   [place]ola Bilateral:;elastic   Nipples Bilateral:;everted   Maternal Infant Feeding   Maternal Emotional State assist needed   Infant Positioning laid back (ventral)   Signs of Milk Transfer audible swallow;infant jaw motion present   Pain with Feeding no   Latch Assistance yes     Infant skin to skin with patient. Assisted with latching on the left side in laid back position. Infant able to obtain and sustain effective latch. Suck and swallow verified. Patient denies pain with breastfeeding. Signs of an effective latch and hunger/satiety cues reviewed. Encouraged to feed infant on cue at least 8 in 24. Basic breastfeeding information reviewed utilizing breastfeeding guide. Patient verbalized understanding. All questions answered. Told to call for further lactation support needs.

## 2023-08-24 NOTE — ANESTHESIA PROCEDURE NOTES
Epidural    Patient location during procedure: OB   Reason for block: primary anesthetic   Reason for block: labor analgesia requested by patient and obstetrician  Diagnosis: prenancy   Start time: 8/24/2023 3:45 AM  Timeout: 8/24/2023 3:37 AM  End time: 8/24/2023 3:53 AM    Staffing  Performing Provider: Jose Subramanian DO  Authorizing Provider: Jose Subramanian DO    Staffing  Performed by: Jose Subramanian DO  Authorized by: Jose Subramanian DO        Preanesthetic Checklist  Completed: patient identified, IV checked, site marked, risks and benefits discussed, surgical consent, monitors and equipment checked, pre-op evaluation, timeout performed, anesthesia consent given, hand hygiene performed and patient being monitored  Preparation  Patient position: sitting  Prep: ChloraPrep  Patient monitoring: ECG, Pulse Ox and Blood Pressure  Reason for block: primary anesthetic   Epidural  Skin Anesthetic: lidocaine 1%  Skin Wheal: 3 mL  Administration type: continuous  Approach: midline  Interspace: L3-4    Injection technique: YAA saline  Needle and Epidural Catheter  Needle type: Tuohy   Needle gauge: 17  Needle length: 5.0 inches  Needle insertion depth: 8 cm  Catheter type: multi-orifice  Catheter size: 19 G  Catheter at skin depth: 14 cm  Insertion Attempts: 1  Test dose: 3 mL of lidocaine 1.5% with Epi 1-to-200,000  Additional Documentation: no paresthesia on injection, no significant pain on injection, no significant complaints from patient, no signs/symptoms of IV or SA injection and negative aspiration for heme and CSF  Needle localization: anatomical landmarks  Assessment  Ease of block: easy  Patient's tolerance of the procedure: comfortable throughout block No inadvertent dural puncture with Tuohy.  Dural puncture not performed with spinal needle

## 2023-08-24 NOTE — L&D DELIVERY NOTE
[facility]   Delivery Summary  Obstetrics & Gynecology       Date of Delivery:  2023        Preoperative Diagnosis:    Sandoval gestation at 38w2d in active labor  Gestational hypertension  AMA  Maternal obesity  Anemia, iron deficiency      Postoperative Diagnosis:    Sandoval gestation at 38w2d s/p spontaneous vaginal delivery  Live infant  Gestational hypertension  AMA  Maternal obesity  Anemia, iron deficiency    Procedure Performed:  Vaginal delivery    Indication (HPI):     See chart for complete details.  In brief, this is a 38 y.o.  at 38w2d admitted for for induction of labor secondary to gestational hypertension.  The induction was initiated with cyototec followed by pitocin.  Pt progressed well through the active phase of labor and delivered a viable infant.  Maternal and fetal status was overall reassuring throughout the labor course.  SROM 2023 ~640AM with moderate, clear fluid, no odor and had no intrapartum fever.  Pt was informed of the risks, benefits, alternatives and possible complications to a vaginal delivery.  Informed consent was obtained for delivery and blood transfusion.      Anesthesia:  Epidural     QBL:  161 mL with no replacements    Specimens:  Cord blood      Findings, Infant & Apgars:      Live male infant, APGAR 1 min: 9, 5 min: 9, transfer to well baby  Weight 3050 grams  SROM 2023 ~640AM with moderate, clear fluid, no odor and had no intrapartum fever  OA  ~2 cm midline second degree midline vaginal laceration, repaired    Complications:  None    Delivery Summary:      Vaginal delivery of viable infant.  Infant delivered after < 2 minutes of pushing.  Nuchal cord x 1 reduced.  No shoulder dystocia.  ~2 cm midline second degree midline vaginal laceration, repaired  Episiotomy was not performed.  The infant was vigorous with a strong cry.  Cord blood was collected.   The placenta delivered spontaneously intact with three vessel cord.     Uterine massage and Pitocin IV was given until the fundus was firm.    Hemostasis was noted.    No sponges were left in the vagina.   Sponge, lap, needle, and instrument counts were correct time two.   Mother and baby were bonding well at the end of the delivery both in stable condition.   Findings and expectations were discussed with the patient.   All of pt questions were answered to her satisfaction, pt voiced understanding.      Jonel Pollard MD

## 2023-08-24 NOTE — TREATMENT PLAN
Notified Dr. Pollard of [facility] [place] 10/100/-2 with bulging bag.

## 2023-08-24 NOTE — ANESTHESIA POSTPROCEDURE EVALUATION
Anesthesia Post Evaluation    Patient: Ghanshyam Harrison    Procedure(s) Performed: * No procedures listed *    Final Anesthesia Type: epidural      Patient location during evaluation: labor & delivery  Patient participation: Yes- Able to Participate  Level of consciousness: awake and alert  Post-procedure vital signs: reviewed and stable  Pain management: adequate  Airway patency: patent    PONV status at discharge: No PONV  Anesthetic complications: no      Cardiovascular status: blood pressure returned to baseline  Respiratory status: unassisted  Hydration status: euvolemic  Follow-up not needed.          Vitals Value Taken Time   /86 08/24/23 1138   Temp 36.9 °C (98.5 °F) 08/24/23 1138   Pulse 78 08/24/23 1138   Resp 18 08/24/23 1138   SpO2 99 % 08/24/23 1138         No case tracking events are documented in the log.      Pain/Lorene Score: Pain Rating Prior to Med Admin: 5 (8/24/2023 12:26 PM)

## 2023-08-24 NOTE — ANESTHESIA PREPROCEDURE EVALUATION
08/24/2023  Ghanshyam Harrison is a 38 y.o., female.      Pre-op Assessment    I have reviewed the Patient Summary Reports.     I have reviewed the Nursing Notes. I have reviewed the NPO Status.   I have reviewed the Medications.     Review of Systems  Anesthesia Hx:  Neg history of prior surgery. Denies Family Hx of Anesthesia complications.   Denies Personal Hx of Anesthesia complications.   Hematology/Oncology:  Hematology Normal   Oncology Normal     EENT/Dental:EENT/Dental Normal   Cardiovascular:   Hypertension    Pulmonary:  Pulmonary Normal    Renal/:  Renal/ Normal     Hepatic/GI:  Hepatic/GI Normal    Musculoskeletal:  Musculoskeletal Normal    Neurological:  Neurology Normal    Endocrine:  Obesity / BMI > 30  Dermatological:  Skin Normal    Psych:  Psychiatric Normal           Physical Exam  General: Well nourished, Cooperative, Alert and Oriented    Airway:  Mallampati: III   Mouth Opening: Normal  TM Distance: Normal  Tongue: Normal  Neck ROM: Normal ROM    Dental:  Intact        Anesthesia Plan  Type of Anesthesia, risks & benefits discussed:    Anesthesia Type: Epidural  Intra-op Monitoring Plan: Standard ASA Monitors  ASA Score: 2    Ready For Surgery From Anesthesia Perspective.     .

## 2023-08-25 VITALS
HEART RATE: 74 BPM | DIASTOLIC BLOOD PRESSURE: 77 MMHG | TEMPERATURE: 98 F | WEIGHT: 246 LBS | OXYGEN SATURATION: 98 % | BODY MASS INDEX: 39.53 KG/M2 | SYSTOLIC BLOOD PRESSURE: 138 MMHG | HEIGHT: 66 IN | RESPIRATION RATE: 20 BRPM

## 2023-08-25 LAB
BASOPHILS # BLD AUTO: 0.02 K/UL (ref 0–0.2)
BASOPHILS NFR BLD: 0.2 % (ref 0–1.9)
DIFFERENTIAL METHOD: ABNORMAL
EOSINOPHIL # BLD AUTO: 0.1 K/UL (ref 0–0.5)
EOSINOPHIL NFR BLD: 0.7 % (ref 0–8)
ERYTHROCYTE [DISTWIDTH] IN BLOOD BY AUTOMATED COUNT: 15.4 % (ref 11.5–14.5)
HCT VFR BLD AUTO: 29 % (ref 37–48.5)
HGB BLD-MCNC: 9.1 G/DL (ref 12–16)
IMM GRANULOCYTES # BLD AUTO: 0.05 K/UL (ref 0–0.04)
IMM GRANULOCYTES NFR BLD AUTO: 0.5 % (ref 0–0.5)
LYMPHOCYTES # BLD AUTO: 3 K/UL (ref 1–4.8)
LYMPHOCYTES NFR BLD: 31.2 % (ref 18–48)
MCH RBC QN AUTO: 24.6 PG (ref 27–31)
MCHC RBC AUTO-ENTMCNC: 31.4 G/DL (ref 32–36)
MCV RBC AUTO: 78 FL (ref 82–98)
MONOCYTES # BLD AUTO: 0.6 K/UL (ref 0.3–1)
MONOCYTES NFR BLD: 6 % (ref 4–15)
NEUTROPHILS # BLD AUTO: 6 K/UL (ref 1.8–7.7)
NEUTROPHILS NFR BLD: 61.4 % (ref 38–73)
NRBC BLD-RTO: 0 /100 WBC
PLATELET # BLD AUTO: 378 K/UL (ref 150–450)
PMV BLD AUTO: 10.7 FL (ref 9.2–12.9)
RBC # BLD AUTO: 3.7 M/UL (ref 4–5.4)
RPR SER QL: NORMAL
WBC # BLD AUTO: 9.72 K/UL (ref 3.9–12.7)

## 2023-08-25 PROCEDURE — 99238 HOSP IP/OBS DSCHRG MGMT 30/<: CPT | Mod: ,,, | Performed by: OBSTETRICS & GYNECOLOGY

## 2023-08-25 PROCEDURE — 85025 COMPLETE CBC W/AUTO DIFF WBC: CPT | Performed by: OBSTETRICS & GYNECOLOGY

## 2023-08-25 PROCEDURE — 99238 PR HOSPITAL DISCHARGE DAY,<30 MIN: ICD-10-PCS | Mod: ,,, | Performed by: OBSTETRICS & GYNECOLOGY

## 2023-08-25 PROCEDURE — 36415 COLL VENOUS BLD VENIPUNCTURE: CPT | Performed by: OBSTETRICS & GYNECOLOGY

## 2023-08-25 PROCEDURE — 25000003 PHARM REV CODE 250: Performed by: OBSTETRICS & GYNECOLOGY

## 2023-08-25 RX ORDER — IBUPROFEN 600 MG/1
600 TABLET ORAL EVERY 6 HOURS PRN
Qty: 40 TABLET | Refills: 0 | Status: SHIPPED | OUTPATIENT
Start: 2023-08-25

## 2023-08-25 RX ADMIN — IBUPROFEN 600 MG: 600 TABLET ORAL at 06:08

## 2023-08-25 NOTE — PROGRESS NOTES
"[facility]    Obstetrics & Gynecology  Progress Note      Patient Name:  Ghanshyam Harrison  MRN:  [id number]  Admission Date:  2023  Hospital Length of Stay:  2  Attending Physician:  Jonel Pollard MD  Primary Care Provider:  Jonel Pollard MD    Subjective:     Date:  2023    Principal Problem:   (spontaneous vaginal delivery)    Hospital Course:  Post Partum Day # 1 - s/p vaginal delivery at 38w2d    Patient without major complaints  No acute events overnight  Denies headaches, vision changes, epigastric pain, or acute swelling.  Denies dizziness, SOB, MEJIA, or CP  Pain well controlled  Lochia less than menses  Bottle/breast feeding   Breast non-tender, non-engorged  Ambulating, tolerating regular diet, and voiding without diffculty  Bonding well with baby    Objective:     Temp:  [98 °F (36.7 °C)-98.8 °F (37.1 °C)] 98 °F (36.7 °C)  Pulse:  [70-85] 74  Resp:  [18-20] 20  SpO2:  [97 %-100 %] 98 %  BP: (125-155)/(72-90) 138/77    /77 (BP Location: Right arm, Patient Position: Lying)   Pulse 74   Temp 98 °F (36.7 °C) (Axillary)   Resp 20   Ht 5' 6" (1.676 m)   Wt 111.6 kg (246 lb)   LMP 2022   SpO2 98%   Breastfeeding Yes   BMI 39.71 kg/m²     Intake/Output Summary (Last 24 hours) at 2023 1148  Last data filed at 2023 0459  Gross per 24 hour   Intake 1920 ml   Output 3650 ml   Net -1730 ml   Clear, teresa urine    GENERAL:  No acute distress. Alert and oriented x 3.   HEENT:  No scleral icterus. Neck supple. Moist mucus membranes.   LUNGS:  Clear to auscultation bilaterally.   HEART:  Regular rate and rhythm with physiologic heart sounds.   ABDOMEN:  Soft, non-tender, non-distended, no guarding, rigidity, or rebound with normoactive bowel sounds.   FUNDUS:  Firm, nontender below the umbilicus.   EXTREMITIES:  No cramping, claudication.  Trace edema LE. +2 distal pulses. Symmetric, full range of motion, negative [place].  NEUROLOGIC:  Grossly intact " bilaterally. +2 DTR's.   PSYCH:  Mood and affect appropriate.   PERINEUM:  Intact with light lochia.    Labs:      Recent Results (from the past 336 hour(s))   CBC auto differential    Collection Time: 08/25/23  5:03 AM   Result Value Ref Range    WBC 9.72 3.90 - 12.70 K/uL    Hemoglobin 9.1 (L) 12.0 - 16.0 g/dL    Hematocrit 29.0 (L) 37.0 - 48.5 %    Platelets 378 150 - 450 K/uL   CBC with Auto Differential    Collection Time: 08/23/23  3:00 PM   Result Value Ref Range    WBC 7.28 3.90 - 12.70 K/uL    Hemoglobin 9.7 (L) 12.0 - 16.0 g/dL    Hematocrit 30.5 (L) 37.0 - 48.5 %    Platelets 388 150 - 450 K/uL     ABO:  A POS    Assessment:     Post-partum day # 1 - IUP at 38w2d s/p spontaneous vaginal delivery - progressing well.  Gestational hypertension  Anemia, iron deficiency      Plan:     Plan for discharge today.     Iron supplementation, anemia precautions    Monitory BP TID, notify MD if BP > 140/90.  Pre-eclampsia precautions.  Encourage Connected MoM.    Post-partum care instructions and precautions, clinical/delivery findings, and hospital course reviewed with pt prior to discharge.  All of her questions were answered to her satisfaction.  Pt voiced understanding and agrees with discharge.    Return to clinic in 1 weeks for post-partum visit/BP check or sooner if any concerns.        Jonel Pollard MD

## 2023-08-25 NOTE — DISCHARGE SUMMARY
[facility]    Discharge Summary  Obstetrics & Gynecology      Patient Name:  Ghanshyam Harrison  MRN:  [id number]  Admission Date:  2023  Discharge Date:  2023  Hospital Length of Stay:  2  Attending Physician:  Jonel Pollard MD  Discharging Physician:  Jonel Pollard MD  Primary Care Provider:  Jonel Pollard MD    Admitting Diagnosis:       Sandoval gestation at 38w1d in active labor  Gestational hypertension  AMA  Maternal obesity  Anemia, iron deficiency       Discharge Diagnosis:    Sandoval gestation at 38w2d s/p spontaneous vaginal delivery   Gestational hypertension  Anemia, iron deficiency      Procedure performed:      Vaginal delivery    Brief Hospital Course:      See chart for complete details.  In brief, this is a 38 y.o.  at 38w2d admitted for for induction of labor secondary to gestational hypertension.  The induction was initiated with cyototec followed by pitocin.  Pt progressed well through the active phase of labor and delivered a viable infant.  Maternal and fetal status was overall reassuring throughout the labor course. SROM 2023 ~640AM with moderate, clear fluid, no odor and had no intrapartum fever.  See delivery note for further details.  Following delivery, pt was transferred to mother baby unit for routine post-partum care.  Pt had a fairly uncomplicated postpartum course.  Prior to discharge, pt was without major complaints.  Pain was well controlled.  Pt was ambulating, tolerating a regular diet, voiding without difficulty, and bonding well with baby.  Lochia was light.  Vital signs where physiologic and stable.  Physical exam showed no acute findings.  Pt had satisfied routine postpartum discharge criteria and expressed the desire to be discharge from the hospital.     Pertinent Labs or Studies:      Recent Results (from the past 336 hour(s))   CBC auto differential    Collection Time: 23  5:03 AM   Result Value Ref Range    WBC  "9.72 3.90 - 12.70 K/uL    Hemoglobin 9.1 (L) 12.0 - 16.0 g/dL    Hematocrit 29.0 (L) 37.0 - 48.5 %    Platelets 378 150 - 450 K/uL   CBC with Auto Differential    Collection Time: 08/23/23  3:00 PM   Result Value Ref Range    WBC 7.28 3.90 - 12.70 K/uL    Hemoglobin 9.7 (L) 12.0 - 16.0 g/dL    Hematocrit 30.5 (L) 37.0 - 48.5 %    Platelets 388 150 - 450 K/uL     ABO:  A POS    Discharge Exam:      Temp:  [98 °F (36.7 °C)-98.8 °F (37.1 °C)] 98 °F (36.7 °C)  Pulse:  [70-85] 74  Resp:  [18-20] 20  SpO2:  [97 %-100 %] 98 %  BP: (125-155)/(72-90) 138/77    /77 (BP Location: Right arm, Patient Position: Lying)   Pulse 74   Temp 98 °F (36.7 °C) (Axillary)   Resp 20   Ht 5' 6" (1.676 m)   Wt 111.6 kg (246 lb)   LMP 11/20/2022   SpO2 98%   Breastfeeding Yes   BMI 39.71 kg/m²     GENERAL:  No acute distress. Alert and oriented x 3.   HEENT:  Normocephalic. No scleral icterus. Neck supple. Moist mucus membranes.   LUNGS:  Clear to auscultation bilaterally.   HEART:  Regular rate and rhythm with physiologic heart sounds.   ABDOMEN:  Soft, non-tender, non-distended, no guarding, rigidity, or rebound.   FUNDUS:  Firm, nontender below the umbilicus.   EXTREMITIES:  No cramping, claudication.  Trace edema. Good skin turgor. +2 distal pulses, symmetric. Full range of motion.   NEUROLOGIC:  Grossly intact bilaterally.   PSYCH:  Mood and affect appropriate.   PERINEUM:  Intact with light lochia.    Discharge Condition:  Stable      Discharge Disposition:  Home       Discharge Medications:     Resume prenatal vitamins, fergon  Motrin    Discharge Activites:      Resume activities as tolerated with adequate rest  Pelvic rest for 6 weeks   No heavy lifting greater 10 lbs or strenuous activities   Showers only  Wound care instructions and precautions given     Discharge Diet:  Regular diet    Discharge Instructions:      Pt advised to contact the clinic or emergency department for any concerns including but not limited to " an increase in her lochia, abdominal pain, foul vaginal discharge, weakness, decrease activity level, decrease appetite, feeling sad or hopeless, inability to care for her baby, breast pain or infection, difficulty with voiding or having bowel movements, perineal pain or breakdown, wound breakdown, fever >100.4 or abnormal vital signs, shortness of breath, chest pain, dizziness or feeling faint, pain or swelling in her extremities, headaches not relieved with rest and Tylenol, vision changes, seizure activities, abnormal bleeding/bruising, or any other health concerns.  Monitory BP TID, notify MD if BP > 140/90.  Pre-eclampsia precautions.  Encourage [employer] [facility]. Post-partum care instructions/precautions and hospital course reviewed with the patient prior to discharge.  All of pt questions were answered, pt voiced understanding.       Follow-up Visit:  1 weeks for postpartum visit/BP check, or sooner if any problems.       Jonel Pollard MD

## 2023-08-25 NOTE — DISCHARGE INSTRUCTIONS
After a Vaginal Birth    General Discharge Instructions    May follow a regular diet, unless otherwise discussed with physician.  Take showers, not baths unless otherwise discussed with physician.  Activity as tolerated.  No lifting or heavy exercise for 6 weeks, no driving for 2 weeks, no sexual intercourse, douching or tampons for 6 weeks  May return to work/school as discussed with physician  Take medications as directed  Discuss birth control with physician  Breast care support bra worn at all times  Lactation consultant referral number ( [phone] or [phone])    Call Your Healthcare Provider Right Away If You Have:  A temperature of 100.4°F or higher.  If your blood pressure is over 155/105.  You have difficulty catching your breath or trouble breathing.  Heavy vaginal bleeding, clots, or vaginal discharge with foul odor. (heavier than menses)  Persistent nausea or vomiting.  You gain more than 3 pounds in 3 days.  Severe headaches not relieved by Tylenol (acetaminophen) or Motrin (ibuprofen)  Blurry or double vision, see spots or flashing lights.  Dizziness or fainting.  New onset swelling or worsening of existing swelling.  Burning or pain when you urinate.  No bowel movement for 5 days.  Redness, warmth, swelling, or pain in the lower leg.  Redness, discharge, or pain worse than you had in the hospital.  Burning, pain, red streaks, or lumpy areas in your breasts.  Cracks, blisters, or blood on your nipples.  Feelings of extreme sadness or anxiety, or a feeling that you dont want to be with your baby.  If you have any new or unusual symptoms or have questions or concerns    Some of these symptoms can occur up to 4 to 6 weeks after delivery. This can be a sign of pre-eclampsia, which is a serious disease that can cause stroke, seizures, organ damage, or death. Do not wait to call your doctor or seek medical attention.    If You Had Stitches  You may have received stitches in the skin near your vagina.  The stitches might have closed an episiotomy (an incision that enlarges the opening of the vagina). Or you may have needed stitches to repair torn skin. Either way, your stitches should dissolve within weeks. Until then, you can help reduce discomfort, aid healing, and reduce your risk of infection by keeping the stitches clean. These tips can help:    Gently wipe from front to back after you urinate or have a bowel movement.  After wiping, spray warm water on the area. Or you can have a sitz bath. This means sitting in a tub with a few inches of water in it. Then pat the area dry or use a hairdryer on a cool setting.  You can take a shower instead of a bath.  Change sanitary pads at least every 2-4 hours.  Place cold or heat packs on the area as directed by your doctors or nurses. Keep a thin towel between the pack and your skin.  Sit on firm seats so the stitches pull less.     Follow-Up  Schedule a  follow-up exam with your healthcare provider for about 6 weeks after delivery. During this exam, your uterus and vaginal area will be checked. Contact your healthcare provider if you think you or your baby are having any problems.

## 2023-09-14 ENCOUNTER — PATIENT MESSAGE (OUTPATIENT)
Dept: OBSTETRICS AND GYNECOLOGY | Facility: CLINIC | Age: 38
End: 2023-09-14
Payer: MEDICAID

## 2023-11-27 PROBLEM — O13.3 GESTATIONAL HYPERTENSION, THIRD TRIMESTER: Status: RESOLVED | Noted: 2023-08-24 | Resolved: 2023-11-27

## 2024-12-16 ENCOUNTER — PATIENT MESSAGE (OUTPATIENT)
Dept: RESEARCH | Facility: OTHER | Age: 39
End: 2024-12-16
Payer: MEDICAID